# Patient Record
Sex: FEMALE | Race: OTHER | HISPANIC OR LATINO
[De-identification: names, ages, dates, MRNs, and addresses within clinical notes are randomized per-mention and may not be internally consistent; named-entity substitution may affect disease eponyms.]

---

## 2017-11-16 ENCOUNTER — APPOINTMENT (OUTPATIENT)
Dept: INTERNAL MEDICINE | Facility: CLINIC | Age: 52
End: 2017-11-16

## 2017-11-16 ENCOUNTER — OUTPATIENT (OUTPATIENT)
Dept: OUTPATIENT SERVICES | Facility: HOSPITAL | Age: 52
LOS: 1 days | Discharge: HOME | End: 2017-11-16

## 2017-11-16 ENCOUNTER — RESULT REVIEW (OUTPATIENT)
Age: 52
End: 2017-11-16

## 2017-11-16 VITALS
SYSTOLIC BLOOD PRESSURE: 138 MMHG | DIASTOLIC BLOOD PRESSURE: 89 MMHG | WEIGHT: 256 LBS | TEMPERATURE: 97.7 F | HEART RATE: 89 BPM | HEIGHT: 64.25 IN | BODY MASS INDEX: 43.71 KG/M2

## 2017-11-16 DIAGNOSIS — Z82.3 FAMILY HISTORY OF STROKE: ICD-10-CM

## 2017-11-16 DIAGNOSIS — Z83.3 FAMILY HISTORY OF DIABETES MELLITUS: ICD-10-CM

## 2017-11-16 RX ORDER — HYDROCHLOROTHIAZIDE 25 MG/1
25 TABLET ORAL
Qty: 30 | Refills: 0 | Status: DISCONTINUED | COMMUNITY
Start: 2017-08-31

## 2017-11-16 RX ORDER — NITROFURANTOIN (MONOHYDRATE/MACROCRYSTALS) 25; 75 MG/1; MG/1
100 CAPSULE ORAL
Qty: 14 | Refills: 0 | Status: DISCONTINUED | COMMUNITY
Start: 2017-08-31

## 2017-11-16 RX ORDER — KETOROLAC TROMETHAMINE 5 MG/ML
0.5 SOLUTION OPHTHALMIC
Qty: 5 | Refills: 0 | Status: DISCONTINUED | COMMUNITY
Start: 2017-08-31

## 2017-11-17 LAB
ALBUMIN SERPL-MCNC: 3.8 G/DL
ALBUMIN/GLOB SERPL: 1.12
ALP SERPL-CCNC: 89 IU/L
ALT SERPL-CCNC: 21 IU/L
ANION GAP SERPL CALC-SCNC: 8 MEQ/L
AST SERPL-CCNC: 19 IU/L
BASOPHILS # BLD: 0.02 TH/MM3
BASOPHILS NFR BLD: 0.3 %
BILIRUB SERPL-MCNC: 0.6 MG/DL
BUN SERPL-MCNC: 13 MG/DL
BUN/CREAT SERPL: 14.6 %
CALCIUM SERPL-MCNC: 10 MG/DL
CHLORIDE SERPL-SCNC: 104 MEQ/L
CHOLEST SERPL-MCNC: 271 MG/DL
CO2 SERPL-SCNC: 28 MEQ/L
CREAT SERPL-MCNC: 0.89 MG/DL
DIFFERENTIAL METHOD BLD: NORMAL
EOSINOPHIL # BLD: 0.07 TH/MM3
EOSINOPHIL NFR BLD: 1.2 %
ERYTHROCYTE [DISTWIDTH] IN BLOOD BY AUTOMATED COUNT: 16.6 %
ESTIMATED AVERGAGE GLUCOSE (NORTH): 134 MG/DL
GFR SERPL CREATININE-BSD FRML MDRD: 66
GLUCOSE SERPL-MCNC: 111 MG/DL
GRANULOCYTES # BLD: 2.84 TH/MM3
GRANULOCYTES NFR BLD: 47.4 %
HBA1C MFR BLD: 6.3 %
HCT VFR BLD AUTO: 41.5 %
HDLC SERPL-MCNC: 59 MG/DL
HDLC SERPL: 4.59
HGB BLD-MCNC: 13 G/DL
IMM GRANULOCYTES # BLD: 0.01 TH/MM3
IMM GRANULOCYTES NFR BLD: 0.2 %
LDLC SERPL DIRECT ASSAY-MCNC: 190 MG/DL
LYMPHOCYTES # BLD: 2.58 TH/MM3
LYMPHOCYTES NFR BLD: 43.1 %
MCH RBC QN AUTO: 24.5 PG
MCHC RBC AUTO-ENTMCNC: 31.3 G/DL
MCV RBC AUTO: 78.2 FL
MONOCYTES # BLD: 0.47 TH/MM3
MONOCYTES NFR BLD: 7.8 %
PLATELET # BLD: 465 TH/MM3
PMV BLD AUTO: 10.9 FL
POTASSIUM SERPL-SCNC: 4.5 MMOL/L
PROT SERPL-MCNC: 7.2 G/DL
RBC # BLD AUTO: 5.31 MIL/MM3
SODIUM SERPL-SCNC: 140 MEQ/L
TRIGL SERPL-MCNC: 180 MG/DL
TSH SERPL DL<=0.005 MIU/L-ACNC: 0.01 UIU/ML
VLDLC SERPL-MCNC: 36 MG/DL
WBC # BLD: 5.99 TH/MM3

## 2017-11-20 ENCOUNTER — RX RENEWAL (OUTPATIENT)
Age: 52
End: 2017-11-20

## 2017-11-20 DIAGNOSIS — M25.562 PAIN IN LEFT KNEE: ICD-10-CM

## 2017-11-20 DIAGNOSIS — I10 ESSENTIAL (PRIMARY) HYPERTENSION: ICD-10-CM

## 2017-11-20 DIAGNOSIS — R73.03 PREDIABETES: ICD-10-CM

## 2017-11-20 DIAGNOSIS — E66.01 MORBID (SEVERE) OBESITY DUE TO EXCESS CALORIES: ICD-10-CM

## 2017-11-20 DIAGNOSIS — E78.00 PURE HYPERCHOLESTEROLEMIA, UNSPECIFIED: ICD-10-CM

## 2017-11-20 DIAGNOSIS — Z23 ENCOUNTER FOR IMMUNIZATION: ICD-10-CM

## 2017-11-29 ENCOUNTER — OUTPATIENT (OUTPATIENT)
Dept: OUTPATIENT SERVICES | Facility: HOSPITAL | Age: 52
LOS: 1 days | Discharge: HOME | End: 2017-11-29

## 2017-11-29 DIAGNOSIS — Z12.31 ENCOUNTER FOR SCREENING MAMMOGRAM FOR MALIGNANT NEOPLASM OF BREAST: ICD-10-CM

## 2017-11-29 DIAGNOSIS — M19.90 UNSPECIFIED OSTEOARTHRITIS, UNSPECIFIED SITE: ICD-10-CM

## 2017-12-12 ENCOUNTER — APPOINTMENT (OUTPATIENT)
Dept: SURGERY | Facility: CLINIC | Age: 52
End: 2017-12-12
Payer: COMMERCIAL

## 2017-12-12 ENCOUNTER — OUTPATIENT (OUTPATIENT)
Dept: OUTPATIENT SERVICES | Facility: HOSPITAL | Age: 52
LOS: 1 days | Discharge: HOME | End: 2017-12-12

## 2017-12-12 VITALS
SYSTOLIC BLOOD PRESSURE: 148 MMHG | WEIGHT: 261 LBS | DIASTOLIC BLOOD PRESSURE: 76 MMHG | HEIGHT: 64.25 IN | BODY MASS INDEX: 44.56 KG/M2

## 2017-12-12 PROCEDURE — SI006: CPT

## 2017-12-15 ENCOUNTER — OUTPATIENT (OUTPATIENT)
Dept: OUTPATIENT SERVICES | Facility: HOSPITAL | Age: 52
LOS: 1 days | Discharge: HOME | End: 2017-12-15

## 2017-12-15 DIAGNOSIS — R10.9 UNSPECIFIED ABDOMINAL PAIN: ICD-10-CM

## 2018-01-17 ENCOUNTER — APPOINTMENT (OUTPATIENT)
Dept: SURGERY | Facility: CLINIC | Age: 53
End: 2018-01-17

## 2018-01-23 ENCOUNTER — APPOINTMENT (OUTPATIENT)
Dept: SURGERY | Facility: CLINIC | Age: 53
End: 2018-01-23

## 2018-01-26 ENCOUNTER — APPOINTMENT (OUTPATIENT)
Dept: SURGERY | Facility: CLINIC | Age: 53
End: 2018-01-26
Payer: COMMERCIAL

## 2018-01-26 VITALS
SYSTOLIC BLOOD PRESSURE: 142 MMHG | DIASTOLIC BLOOD PRESSURE: 80 MMHG | HEIGHT: 64.25 IN | WEIGHT: 269.1 LBS | BODY MASS INDEX: 45.94 KG/M2

## 2018-01-26 DIAGNOSIS — H10.13 ACUTE ATOPIC CONJUNCTIVITIS, BILATERAL: ICD-10-CM

## 2018-01-26 PROCEDURE — 99244 OFF/OP CNSLTJ NEW/EST MOD 40: CPT

## 2018-01-26 RX ORDER — CETIRIZINE HYDROCHLORIDE 10 MG/1
10 TABLET, COATED ORAL
Qty: 30 | Refills: 0 | Status: DISCONTINUED | COMMUNITY
Start: 2017-11-16 | End: 2018-01-26

## 2018-02-01 ENCOUNTER — APPOINTMENT (OUTPATIENT)
Dept: SURGERY | Facility: CLINIC | Age: 53
End: 2018-02-01
Payer: COMMERCIAL

## 2018-02-01 VITALS — HEIGHT: 64.25 IN | WEIGHT: 269.2 LBS | BODY MASS INDEX: 45.96 KG/M2

## 2018-02-01 PROCEDURE — 97802 MEDICAL NUTRITION INDIV IN: CPT

## 2018-02-02 ENCOUNTER — TRANSCRIPTION ENCOUNTER (OUTPATIENT)
Age: 53
End: 2018-02-02

## 2018-02-20 ENCOUNTER — OUTPATIENT (OUTPATIENT)
Dept: OUTPATIENT SERVICES | Facility: HOSPITAL | Age: 53
LOS: 1 days | Discharge: HOME | End: 2018-02-20

## 2018-02-20 ENCOUNTER — APPOINTMENT (OUTPATIENT)
Dept: INTERNAL MEDICINE | Facility: CLINIC | Age: 53
End: 2018-02-20

## 2018-02-20 VITALS
DIASTOLIC BLOOD PRESSURE: 93 MMHG | WEIGHT: 270 LBS | HEIGHT: 64.55 IN | HEART RATE: 80 BPM | BODY MASS INDEX: 45.53 KG/M2 | SYSTOLIC BLOOD PRESSURE: 142 MMHG

## 2018-02-20 DIAGNOSIS — H53.8 OTHER VISUAL DISTURBANCES: ICD-10-CM

## 2018-02-21 DIAGNOSIS — R42 DIZZINESS AND GIDDINESS: ICD-10-CM

## 2018-02-21 DIAGNOSIS — I10 ESSENTIAL (PRIMARY) HYPERTENSION: ICD-10-CM

## 2018-02-21 DIAGNOSIS — H53.8 OTHER VISUAL DISTURBANCES: ICD-10-CM

## 2018-02-21 RX ORDER — ATORVASTATIN CALCIUM 20 MG/1
20 TABLET, FILM COATED ORAL
Qty: 30 | Refills: 5 | Status: DISCONTINUED | COMMUNITY
Start: 2017-11-17 | End: 2018-02-21

## 2018-03-02 ENCOUNTER — APPOINTMENT (OUTPATIENT)
Dept: INTERNAL MEDICINE | Facility: CLINIC | Age: 53
End: 2018-03-02

## 2018-03-02 ENCOUNTER — OUTPATIENT (OUTPATIENT)
Dept: OUTPATIENT SERVICES | Facility: HOSPITAL | Age: 53
LOS: 1 days | Discharge: HOME | End: 2018-03-02

## 2018-03-05 DIAGNOSIS — E66.01 MORBID (SEVERE) OBESITY DUE TO EXCESS CALORIES: ICD-10-CM

## 2018-03-06 ENCOUNTER — RX RENEWAL (OUTPATIENT)
Age: 53
End: 2018-03-06

## 2018-03-06 LAB
FERRITIN SERPL-MCNC: 24 NG/ML
T3FREE SERPL-MCNC: 3.62 PG/ML
T4 FREE SERPL-MCNC: 1.6 NG/DL
TSH SERPL-ACNC: 0.01 UIU/ML
VIT B12 SERPL-MCNC: 489 PG/ML

## 2018-03-08 ENCOUNTER — APPOINTMENT (OUTPATIENT)
Dept: SURGERY | Facility: CLINIC | Age: 53
End: 2018-03-08
Payer: COMMERCIAL

## 2018-03-08 ENCOUNTER — APPOINTMENT (OUTPATIENT)
Dept: INTERNAL MEDICINE | Facility: CLINIC | Age: 53
End: 2018-03-08

## 2018-03-08 VITALS — BODY MASS INDEX: 45.53 KG/M2 | WEIGHT: 270 LBS | HEIGHT: 64.5 IN

## 2018-03-08 PROCEDURE — 99212 OFFICE O/P EST SF 10 MIN: CPT

## 2018-03-16 ENCOUNTER — RESULT REVIEW (OUTPATIENT)
Age: 53
End: 2018-03-16

## 2018-03-22 ENCOUNTER — APPOINTMENT (OUTPATIENT)
Dept: INTERNAL MEDICINE | Facility: CLINIC | Age: 53
End: 2018-03-22

## 2018-03-27 ENCOUNTER — OUTPATIENT (OUTPATIENT)
Dept: OUTPATIENT SERVICES | Facility: HOSPITAL | Age: 53
LOS: 1 days | Discharge: HOME | End: 2018-03-27

## 2018-03-27 ENCOUNTER — APPOINTMENT (OUTPATIENT)
Dept: INTERNAL MEDICINE | Facility: CLINIC | Age: 53
End: 2018-03-27

## 2018-03-27 VITALS
HEART RATE: 62 BPM | BODY MASS INDEX: 46.04 KG/M2 | HEIGHT: 64.5 IN | SYSTOLIC BLOOD PRESSURE: 140 MMHG | WEIGHT: 273 LBS | DIASTOLIC BLOOD PRESSURE: 86 MMHG

## 2018-03-27 DIAGNOSIS — E78.2 MIXED HYPERLIPIDEMIA: ICD-10-CM

## 2018-03-27 DIAGNOSIS — E66.1 DRUG-INDUCED OBESITY: ICD-10-CM

## 2018-03-27 DIAGNOSIS — R73.03 PREDIABETES: ICD-10-CM

## 2018-03-27 DIAGNOSIS — I10 ESSENTIAL (PRIMARY) HYPERTENSION: ICD-10-CM

## 2018-03-28 DIAGNOSIS — I10 ESSENTIAL (PRIMARY) HYPERTENSION: ICD-10-CM

## 2018-03-28 DIAGNOSIS — E78.00 PURE HYPERCHOLESTEROLEMIA, UNSPECIFIED: ICD-10-CM

## 2018-03-28 DIAGNOSIS — R73.03 PREDIABETES: ICD-10-CM

## 2018-03-28 DIAGNOSIS — E05.90 THYROTOXICOSIS, UNSPECIFIED WITHOUT THYROTOXIC CRISIS OR STORM: ICD-10-CM

## 2018-03-28 DIAGNOSIS — E66.01 MORBID (SEVERE) OBESITY DUE TO EXCESS CALORIES: ICD-10-CM

## 2018-04-12 ENCOUNTER — APPOINTMENT (OUTPATIENT)
Dept: SURGERY | Facility: CLINIC | Age: 53
End: 2018-04-12
Payer: SELF-PAY

## 2018-04-12 VITALS — BODY MASS INDEX: 46.21 KG/M2 | HEIGHT: 64.5 IN | WEIGHT: 274 LBS

## 2018-04-12 PROCEDURE — 99212 OFFICE O/P EST SF 10 MIN: CPT

## 2018-05-05 RX ORDER — MULTIVIT-MIN/FOLIC/VIT K/LYCOP 400-300MCG
25 MCG TABLET ORAL
Qty: 30 | Refills: 3 | Status: ACTIVE | COMMUNITY
Start: 2018-03-27 | End: 1900-01-01

## 2018-05-05 RX ORDER — ERGOCALCIFEROL 1.25 MG/1
1.25 MG CAPSULE, LIQUID FILLED ORAL
Qty: 2 | Refills: 0 | Status: ACTIVE | COMMUNITY
Start: 2018-03-27 | End: 1900-01-01

## 2018-05-15 ENCOUNTER — APPOINTMENT (OUTPATIENT)
Dept: SURGERY | Facility: CLINIC | Age: 53
End: 2018-05-15
Payer: MEDICAID

## 2018-05-15 VITALS — HEIGHT: 64.5 IN | WEIGHT: 272 LBS | BODY MASS INDEX: 45.87 KG/M2

## 2018-05-15 PROCEDURE — 99212 OFFICE O/P EST SF 10 MIN: CPT

## 2018-05-17 ENCOUNTER — OUTPATIENT (OUTPATIENT)
Dept: OUTPATIENT SERVICES | Facility: HOSPITAL | Age: 53
LOS: 1 days | Discharge: HOME | End: 2018-05-17

## 2018-05-17 ENCOUNTER — APPOINTMENT (OUTPATIENT)
Dept: INTERNAL MEDICINE | Facility: CLINIC | Age: 53
End: 2018-05-17

## 2018-05-17 VITALS
HEIGHT: 64.5 IN | HEART RATE: 72 BPM | DIASTOLIC BLOOD PRESSURE: 81 MMHG | WEIGHT: 274 LBS | BODY MASS INDEX: 46.21 KG/M2 | SYSTOLIC BLOOD PRESSURE: 138 MMHG

## 2018-05-18 LAB
25(OH)D3 SERPL-MCNC: 17.6 NG/ML
CALCIUM SERPL-MCNC: 9.7 MG/DL
CHOLEST SERPL-MCNC: 182 MG/DL
CHOLEST/HDLC SERPL: 3.2 RATIO
FOLATE RBC-MCNC: 902 NG/ML
HCT VFR BLD CALC: 44 %
HDLC SERPL-MCNC: 57 MG/DL
IRON SATN MFR SERPL: 13 %
IRON SERPL-MCNC: 42 UG/DL
LDLC SERPL CALC-MCNC: 96 MG/DL
PARATHYROID HORMONE INTACT: 27 PG/ML
TIBC SERPL-MCNC: 334 UG/DL
TRIGL SERPL-MCNC: 144 MG/DL
UIBC SERPL-MCNC: 292 UG/DL
VIT B1 SERPL-MCNC: 131.3 NMOL/L

## 2018-05-21 ENCOUNTER — APPOINTMENT (OUTPATIENT)
Dept: INTERNAL MEDICINE | Facility: CLINIC | Age: 53
End: 2018-05-21

## 2018-05-28 ENCOUNTER — FORM ENCOUNTER (OUTPATIENT)
Age: 53
End: 2018-05-28

## 2018-05-29 ENCOUNTER — OUTPATIENT (OUTPATIENT)
Dept: OUTPATIENT SERVICES | Facility: HOSPITAL | Age: 53
LOS: 1 days | Discharge: HOME | End: 2018-05-29

## 2018-05-29 DIAGNOSIS — N95.0 POSTMENOPAUSAL BLEEDING: ICD-10-CM

## 2018-05-29 LAB — HPV HIGH+LOW RISK DNA PNL CVX: NOT DETECTED

## 2018-05-31 DIAGNOSIS — N95.0 POSTMENOPAUSAL BLEEDING: ICD-10-CM

## 2018-06-14 ENCOUNTER — OUTPATIENT (OUTPATIENT)
Dept: OUTPATIENT SERVICES | Facility: HOSPITAL | Age: 53
LOS: 1 days | Discharge: HOME | End: 2018-06-14

## 2018-06-14 DIAGNOSIS — R05 COUGH: ICD-10-CM

## 2018-06-20 ENCOUNTER — APPOINTMENT (OUTPATIENT)
Dept: INTERNAL MEDICINE | Facility: CLINIC | Age: 53
End: 2018-06-20

## 2018-06-21 ENCOUNTER — EMERGENCY (EMERGENCY)
Facility: HOSPITAL | Age: 53
LOS: 0 days | Discharge: HOME | End: 2018-06-21
Admitting: PHYSICIAN ASSISTANT

## 2018-06-21 ENCOUNTER — APPOINTMENT (OUTPATIENT)
Dept: SURGERY | Facility: CLINIC | Age: 53
End: 2018-06-21
Payer: MEDICAID

## 2018-06-21 VITALS
RESPIRATION RATE: 20 BRPM | OXYGEN SATURATION: 98 % | DIASTOLIC BLOOD PRESSURE: 66 MMHG | TEMPERATURE: 99 F | HEART RATE: 85 BPM | SYSTOLIC BLOOD PRESSURE: 105 MMHG

## 2018-06-21 VITALS
RESPIRATION RATE: 20 BRPM | HEART RATE: 76 BPM | OXYGEN SATURATION: 100 % | DIASTOLIC BLOOD PRESSURE: 74 MMHG | SYSTOLIC BLOOD PRESSURE: 144 MMHG | TEMPERATURE: 98 F

## 2018-06-21 VITALS — WEIGHT: 272 LBS | HEIGHT: 64.5 IN | BODY MASS INDEX: 45.87 KG/M2

## 2018-06-21 DIAGNOSIS — M25.561 PAIN IN RIGHT KNEE: ICD-10-CM

## 2018-06-21 DIAGNOSIS — E78.5 HYPERLIPIDEMIA, UNSPECIFIED: ICD-10-CM

## 2018-06-21 DIAGNOSIS — I10 ESSENTIAL (PRIMARY) HYPERTENSION: ICD-10-CM

## 2018-06-21 PROCEDURE — 99212 OFFICE O/P EST SF 10 MIN: CPT

## 2018-06-21 RX ORDER — IBUPROFEN 200 MG
600 TABLET ORAL ONCE
Qty: 0 | Refills: 0 | Status: COMPLETED | OUTPATIENT
Start: 2018-06-21 | End: 2018-06-21

## 2018-06-21 RX ADMIN — Medication 600 MILLIGRAM(S): at 16:08

## 2018-06-21 NOTE — ED PROVIDER NOTE - OBJECTIVE STATEMENT
52 y/o F, PMHx HTN & Dyslipidemia, presents to the ED with complaints of right knee pain x one week. She admits to pain along the medial aspect of her right knee that is exacerbated upon ascending/descending the stairs. She admits to having arthritis in her left knee and plans on having bariatric surgery in the future. She denies any swelling, skin color changes/rash, inciting trauma/injury, calf pain and additional areas of pain.

## 2018-06-28 ENCOUNTER — OUTPATIENT (OUTPATIENT)
Dept: OUTPATIENT SERVICES | Facility: HOSPITAL | Age: 53
LOS: 1 days | Discharge: HOME | End: 2018-06-28

## 2018-06-28 ENCOUNTER — APPOINTMENT (OUTPATIENT)
Dept: INTERNAL MEDICINE | Facility: CLINIC | Age: 53
End: 2018-06-28

## 2018-06-28 VITALS — WEIGHT: 272 LBS | BODY MASS INDEX: 45.87 KG/M2 | HEIGHT: 64.5 IN

## 2018-06-28 DIAGNOSIS — Z00.00 ENCOUNTER FOR GENERAL ADULT MEDICAL EXAMINATION W/OUT ABNORMAL FINDINGS: ICD-10-CM

## 2018-06-28 RX ORDER — LISINOPRIL 10 MG/1
10 TABLET ORAL TWICE DAILY
Qty: 30 | Refills: 3 | Status: DISCONTINUED | COMMUNITY
Start: 2018-02-20 | End: 2018-06-28

## 2018-06-28 NOTE — HISTORY OF PRESENT ILLNESS
[FreeTextEntry1] : 53 y/o female Hx of Pre-DM,  LMP about year then had menses x 5 days. Patient currently denies any complaints. \par \par Patient denies any fevers, chills, and night sweats. Patient denies any chest pain, shortness of breath, and palpitations. Patient denies any nausea, vomiting, and diarrhea.\par \par GYN HX\par gyn surgeries c/s x 1\par no STD's\par no hx of abnormal pap \par  [de-identified] : 53 y/o female Hx of Pre-DM,  LMP about year then had menses x 5 days. Patient currently denies any complaints. \par \par Patient denies any fevers, chills, and night sweats. Patient denies any chest pain, shortness of breath, and palpitations. Patient denies any nausea, vomiting, and diarrhea.\par \par GYN HX\par gyn surgeries c/s x 1\par no STD's\par no hx of abnormal pap \par

## 2018-06-28 NOTE — ASSESSMENT
[FreeTextEntry1] : 51 y/o female Hx of Pre-DM,  LMP about year then had menses x 5 days. Patietn currently denies any complaints.\par \par #) Post-menopausal bleeding \par -refer to [facility] for Bx given on last visit\par \par #) Enlarged Uterus\par -Transvaginal US (2018)- postmenopausal thickened endometrial stripe 0.69cm compatible w/ hyperplasia, neoplasia, or polyp, fibroid uterus, nonvisualization of the ovaries due to bowel gas pattern\par \par #) HCM\par -next mammogram 2018, last 2017- negative, BIRADS-1\par -HPV/Pap smear (2018)- negative

## 2018-07-16 ENCOUNTER — APPOINTMENT (OUTPATIENT)
Dept: SURGERY | Facility: CLINIC | Age: 53
End: 2018-07-16
Payer: MEDICAID

## 2018-07-16 VITALS — BODY MASS INDEX: 46.78 KG/M2 | WEIGHT: 274 LBS | HEIGHT: 64.25 IN

## 2018-07-16 PROCEDURE — 99212 OFFICE O/P EST SF 10 MIN: CPT

## 2018-07-16 RX ORDER — CETIRIZINE HYDROCHLORIDE 10 MG/1
10 TABLET, COATED ORAL DAILY
Qty: 30 | Refills: 1 | Status: DISCONTINUED | COMMUNITY
Start: 2018-02-20 | End: 2018-07-16

## 2018-07-18 ENCOUNTER — LABORATORY RESULT (OUTPATIENT)
Age: 53
End: 2018-07-18

## 2018-07-18 ENCOUNTER — APPOINTMENT (OUTPATIENT)
Dept: OBGYN | Facility: CLINIC | Age: 53
End: 2018-07-18

## 2018-07-18 ENCOUNTER — OUTPATIENT (OUTPATIENT)
Dept: OUTPATIENT SERVICES | Facility: HOSPITAL | Age: 53
LOS: 1 days | Discharge: HOME | End: 2018-07-18

## 2018-07-18 VITALS — WEIGHT: 273 LBS | BODY MASS INDEX: 46.5 KG/M2 | SYSTOLIC BLOOD PRESSURE: 122 MMHG | DIASTOLIC BLOOD PRESSURE: 80 MMHG

## 2018-07-18 DIAGNOSIS — N88.2 STRICTURE AND STENOSIS OF CERVIX UTERI: ICD-10-CM

## 2018-07-19 DIAGNOSIS — N95.0 POSTMENOPAUSAL BLEEDING: ICD-10-CM

## 2018-07-19 DIAGNOSIS — N88.2 STRICTURE AND STENOSIS OF CERVIX UTERI: ICD-10-CM

## 2018-07-21 ENCOUNTER — INPATIENT (INPATIENT)
Facility: HOSPITAL | Age: 53
LOS: 5 days | Discharge: HOME | End: 2018-07-27
Attending: INTERNAL MEDICINE | Admitting: INTERNAL MEDICINE
Payer: MEDICAID

## 2018-07-21 VITALS
DIASTOLIC BLOOD PRESSURE: 91 MMHG | RESPIRATION RATE: 18 BRPM | HEART RATE: 87 BPM | OXYGEN SATURATION: 100 % | TEMPERATURE: 97 F | SYSTOLIC BLOOD PRESSURE: 196 MMHG

## 2018-07-21 DIAGNOSIS — I10 ESSENTIAL (PRIMARY) HYPERTENSION: ICD-10-CM

## 2018-07-21 DIAGNOSIS — R47.81 SLURRED SPEECH: ICD-10-CM

## 2018-07-21 DIAGNOSIS — R29.703 NIHSS SCORE 3: ICD-10-CM

## 2018-07-21 DIAGNOSIS — I63.9 CEREBRAL INFARCTION, UNSPECIFIED: ICD-10-CM

## 2018-07-21 DIAGNOSIS — E03.8 OTHER SPECIFIED HYPOTHYROIDISM: ICD-10-CM

## 2018-07-21 DIAGNOSIS — M54.12 RADICULOPATHY, CERVICAL REGION: ICD-10-CM

## 2018-07-21 DIAGNOSIS — K21.9 GASTRO-ESOPHAGEAL REFLUX DISEASE WITHOUT ESOPHAGITIS: ICD-10-CM

## 2018-07-21 DIAGNOSIS — G43.109 MIGRAINE WITH AURA, NOT INTRACTABLE, WITHOUT STATUS MIGRAINOSUS: ICD-10-CM

## 2018-07-21 DIAGNOSIS — E66.01 MORBID (SEVERE) OBESITY DUE TO EXCESS CALORIES: ICD-10-CM

## 2018-07-21 DIAGNOSIS — E78.5 HYPERLIPIDEMIA, UNSPECIFIED: ICD-10-CM

## 2018-07-21 DIAGNOSIS — Z90.49 ACQUIRED ABSENCE OF OTHER SPECIFIED PARTS OF DIGESTIVE TRACT: Chronic | ICD-10-CM

## 2018-07-21 DIAGNOSIS — G81.94 HEMIPLEGIA, UNSPECIFIED AFFECTING LEFT NONDOMINANT SIDE: ICD-10-CM

## 2018-07-21 DIAGNOSIS — A04.8 OTHER SPECIFIED BACTERIAL INTESTINAL INFECTIONS: ICD-10-CM

## 2018-07-21 LAB
ALBUMIN SERPL ELPH-MCNC: 4.3 G/DL — SIGNIFICANT CHANGE UP (ref 3.5–5.2)
ALLERGY+IMMUNOLOGY DIAG STUDY NOTE: SIGNIFICANT CHANGE UP
ALP SERPL-CCNC: 93 U/L — SIGNIFICANT CHANGE UP (ref 30–115)
ALT FLD-CCNC: 20 U/L — SIGNIFICANT CHANGE UP (ref 0–41)
ANION GAP SERPL CALC-SCNC: 15 MMOL/L — HIGH (ref 7–14)
APTT BLD: 33 SEC — SIGNIFICANT CHANGE UP (ref 27–39.2)
AST SERPL-CCNC: 18 U/L — SIGNIFICANT CHANGE UP (ref 0–41)
BASE EXCESS BLDV CALC-SCNC: 2.1 MMOL/L — HIGH (ref -2–2)
BASOPHILS # BLD AUTO: 0.04 K/UL — SIGNIFICANT CHANGE UP (ref 0–0.2)
BASOPHILS NFR BLD AUTO: 0.6 % — SIGNIFICANT CHANGE UP (ref 0–1)
BILIRUB SERPL-MCNC: 0.3 MG/DL — SIGNIFICANT CHANGE UP (ref 0.2–1.2)
BUN SERPL-MCNC: 13 MG/DL — SIGNIFICANT CHANGE UP (ref 10–20)
CA-I SERPL-SCNC: 1.2 MMOL/L — SIGNIFICANT CHANGE UP (ref 1.12–1.3)
CALCIUM SERPL-MCNC: 9.5 MG/DL — SIGNIFICANT CHANGE UP (ref 8.5–10.1)
CHLORIDE SERPL-SCNC: 99 MMOL/L — SIGNIFICANT CHANGE UP (ref 98–110)
CK SERPL-CCNC: 163 U/L — SIGNIFICANT CHANGE UP (ref 0–225)
CO2 SERPL-SCNC: 26 MMOL/L — SIGNIFICANT CHANGE UP (ref 17–32)
CREAT SERPL-MCNC: 1 MG/DL — SIGNIFICANT CHANGE UP (ref 0.7–1.5)
EOSINOPHIL # BLD AUTO: 0.17 K/UL — SIGNIFICANT CHANGE UP (ref 0–0.7)
EOSINOPHIL NFR BLD AUTO: 2.7 % — SIGNIFICANT CHANGE UP (ref 0–8)
GAS PNL BLDV: 140 MMOL/L — SIGNIFICANT CHANGE UP (ref 136–145)
GAS PNL BLDV: SIGNIFICANT CHANGE UP
GLUCOSE SERPL-MCNC: 130 MG/DL — HIGH (ref 70–99)
HCO3 BLDV-SCNC: 28 MMOL/L — SIGNIFICANT CHANGE UP (ref 22–29)
HCT VFR BLD CALC: 39.3 % — SIGNIFICANT CHANGE UP (ref 37–47)
HCT VFR BLDA CALC: 41.6 % — SIGNIFICANT CHANGE UP (ref 34–44)
HGB BLD CALC-MCNC: 13.6 G/DL — LOW (ref 14–18)
HGB BLD-MCNC: 12.8 G/DL — SIGNIFICANT CHANGE UP (ref 12–16)
IMM GRANULOCYTES NFR BLD AUTO: 0.2 % — SIGNIFICANT CHANGE UP (ref 0.1–0.3)
INR BLD: 1.01 RATIO — SIGNIFICANT CHANGE UP (ref 0.65–1.3)
LACTATE BLDV-MCNC: 1.3 MMOL/L — SIGNIFICANT CHANGE UP (ref 0.5–1.6)
LYMPHOCYTES # BLD AUTO: 3.28 K/UL — SIGNIFICANT CHANGE UP (ref 1.2–3.4)
LYMPHOCYTES # BLD AUTO: 51.8 % — HIGH (ref 20.5–51.1)
MCHC RBC-ENTMCNC: 25.9 PG — LOW (ref 27–31)
MCHC RBC-ENTMCNC: 32.6 G/DL — SIGNIFICANT CHANGE UP (ref 32–37)
MCV RBC AUTO: 79.4 FL — LOW (ref 81–99)
MONOCYTES # BLD AUTO: 0.49 K/UL — SIGNIFICANT CHANGE UP (ref 0.1–0.6)
MONOCYTES NFR BLD AUTO: 7.7 % — SIGNIFICANT CHANGE UP (ref 1.7–9.3)
NEUTROPHILS # BLD AUTO: 2.34 K/UL — SIGNIFICANT CHANGE UP (ref 1.4–6.5)
NEUTROPHILS NFR BLD AUTO: 37 % — LOW (ref 42.2–75.2)
NRBC # BLD: 0 /100 WBCS — SIGNIFICANT CHANGE UP (ref 0–0)
PCO2 BLDV: 48 MMHG — SIGNIFICANT CHANGE UP (ref 41–51)
PH BLDV: 7.37 — SIGNIFICANT CHANGE UP (ref 7.26–7.43)
PLATELET # BLD AUTO: 408 K/UL — HIGH (ref 130–400)
PO2 BLDV: 22 MMHG — SIGNIFICANT CHANGE UP (ref 20–40)
POTASSIUM BLDV-SCNC: 3.6 MMOL/L — SIGNIFICANT CHANGE UP (ref 3.3–5.6)
POTASSIUM SERPL-MCNC: 4 MMOL/L — SIGNIFICANT CHANGE UP (ref 3.5–5)
POTASSIUM SERPL-SCNC: 4 MMOL/L — SIGNIFICANT CHANGE UP (ref 3.5–5)
PROT SERPL-MCNC: 7.7 G/DL — SIGNIFICANT CHANGE UP (ref 6–8)
PROTHROM AB SERPL-ACNC: 10.9 SEC — SIGNIFICANT CHANGE UP (ref 9.95–12.87)
RBC # BLD: 4.95 M/UL — SIGNIFICANT CHANGE UP (ref 4.2–5.4)
RBC # FLD: 15.8 % — HIGH (ref 11.5–14.5)
SAO2 % BLDV: 35 % — SIGNIFICANT CHANGE UP
SODIUM SERPL-SCNC: 140 MMOL/L — SIGNIFICANT CHANGE UP (ref 135–146)
TROPONIN T SERPL-MCNC: <0.01 NG/ML — SIGNIFICANT CHANGE UP
TYPE + AB SCN PNL BLD: SIGNIFICANT CHANGE UP
WBC # BLD: 6.33 K/UL — SIGNIFICANT CHANGE UP (ref 4.8–10.8)
WBC # FLD AUTO: 6.33 K/UL — SIGNIFICANT CHANGE UP (ref 4.8–10.8)

## 2018-07-21 RX ORDER — ALTEPLASE 100 MG
9 KIT INTRAVENOUS ONCE
Qty: 0 | Refills: 0 | Status: COMPLETED | OUTPATIENT
Start: 2018-07-21 | End: 2018-07-21

## 2018-07-21 RX ORDER — CLARITHROMYCIN 500 MG
0 TABLET ORAL
Qty: 0 | Refills: 0 | COMMUNITY

## 2018-07-21 RX ORDER — ACETAMINOPHEN 500 MG
500 TABLET ORAL ONCE
Qty: 0 | Refills: 0 | Status: COMPLETED | OUTPATIENT
Start: 2018-07-21 | End: 2018-07-22

## 2018-07-21 RX ORDER — ALTEPLASE 100 MG
81 KIT INTRAVENOUS ONCE
Qty: 0 | Refills: 0 | Status: COMPLETED | OUTPATIENT
Start: 2018-07-21 | End: 2018-07-21

## 2018-07-21 RX ORDER — LISINOPRIL 2.5 MG/1
0 TABLET ORAL
Qty: 0 | Refills: 0 | COMMUNITY

## 2018-07-21 RX ORDER — ATORVASTATIN CALCIUM 80 MG/1
80 TABLET, FILM COATED ORAL AT BEDTIME
Qty: 0 | Refills: 0 | Status: DISCONTINUED | OUTPATIENT
Start: 2018-07-21 | End: 2018-07-27

## 2018-07-21 RX ORDER — ATORVASTATIN CALCIUM 80 MG/1
1 TABLET, FILM COATED ORAL
Qty: 0 | Refills: 0 | COMMUNITY

## 2018-07-21 RX ORDER — ACETAMINOPHEN 500 MG
500 TABLET ORAL THREE TIMES A DAY
Qty: 0 | Refills: 0 | Status: DISCONTINUED | OUTPATIENT
Start: 2018-07-21 | End: 2018-07-22

## 2018-07-21 RX ORDER — ALTEPLASE 100 MG
100 KIT INTRAVENOUS ONCE
Qty: 0 | Refills: 0 | Status: DISCONTINUED | OUTPATIENT
Start: 2018-07-21 | End: 2018-07-21

## 2018-07-21 RX ORDER — PANTOPRAZOLE SODIUM 20 MG/1
40 TABLET, DELAYED RELEASE ORAL
Qty: 0 | Refills: 0 | Status: DISCONTINUED | OUTPATIENT
Start: 2018-07-21 | End: 2018-07-22

## 2018-07-21 RX ORDER — ALTEPLASE 100 MG
81 KIT INTRAVENOUS ONCE
Qty: 0 | Refills: 0 | Status: DISCONTINUED | OUTPATIENT
Start: 2018-07-21 | End: 2018-07-21

## 2018-07-21 RX ORDER — AMOXICILLIN 250 MG/5ML
0 SUSPENSION, RECONSTITUTED, ORAL (ML) ORAL
Qty: 0 | Refills: 0 | COMMUNITY

## 2018-07-21 RX ORDER — OMEPRAZOLE 10 MG/1
0 CAPSULE, DELAYED RELEASE ORAL
Qty: 0 | Refills: 0 | COMMUNITY

## 2018-07-21 RX ORDER — AMOXICILLIN 250 MG/5ML
1000 SUSPENSION, RECONSTITUTED, ORAL (ML) ORAL
Qty: 0 | Refills: 0 | Status: DISCONTINUED | OUTPATIENT
Start: 2018-07-21 | End: 2018-07-26

## 2018-07-21 RX ADMIN — ALTEPLASE 81 MILLIGRAM(S): KIT at 19:35

## 2018-07-21 RX ADMIN — ALTEPLASE 270 MILLIGRAM(S): KIT at 19:35

## 2018-07-21 NOTE — H&P ADULT - PMH
GERD (gastroesophageal reflux disease)    Helicobacter pylori (H. pylori) infection    High cholesterol    HTN (hypertension)    Prediabetes

## 2018-07-21 NOTE — H&P ADULT - ASSESSMENT
53 y f with pmh of hhtn, hld, gerd, prediabetes, hpylori on treatment p/w dizziness, blurry vision, lt arm numbness and facial numbness    1) lt arm and facial numbness and slurred speecj 2/2 acute cva s/p tpa  - Keep SBP <180 DBP  - Neurochecks and vitals per post tpa protocol  - No blood draws or antiplatelets or anticoagulation in 1st 24 hours  - Dysphagia screen prior to PO meds  - ECHO. lipid profile  - repeat ct head if any change in symptoms  - if NIHSS increases from current score to >6 then would pursue CTA and CT perfusion    2) hpylori- c/w amoxicillin, clarithromycin and protonix- 2 more days of abx    3) hld- increase lipitor 80mg    4) htn- hold lisinopril    5) dvt ppx- scd  gi ppx  full code

## 2018-07-21 NOTE — PATIENT PROFILE ADULT. - AS SC BRADEN MOISTURE
Chronic kidney disease, unspecified stage    COPD (chronic obstructive pulmonary disease)    Hyperlipidemia, unspecified hyperlipidemia type    Hypertension    Lung abscess    PE (pulmonary thromboembolism)    Rebound headache (4) rarely moist

## 2018-07-21 NOTE — H&P ADULT - FAMILY HISTORY
Mother  Still living? Unknown  Family history of stroke, Age at diagnosis: Age Unknown     Sibling  Still living? Unknown  Family history of stroke, Age at diagnosis: Age Unknown

## 2018-07-21 NOTE — ED ADULT NURSE NOTE - OBJECTIVE STATEMENT
as per patient's spouse, patient suddenly became dizzy, had trouble speaking, and has tingling on left side of face. as per spouse symptoms started approx 45 min prior to arrival

## 2018-07-21 NOTE — STROKE CODE NOTE - SUBJECTIVE
52yo woman with history of obesity, borderline hypothyroidism, gerd here for sudden onset of dizziness and difficulty speaking.  Patient was watching tv with  when she suddenly felt  dizzy and according to  had difficulty speaking and her speech was slurred.  He brought her to the ED.  In ED she was slightly better able to speak more but did not resolve completely and improved to NIHSS 3

## 2018-07-21 NOTE — ED PROVIDER NOTE - ATTENDING CONTRIBUTION TO CARE
53 year old female, pmhx of htn, comes in with complaint of difficulty speaking, left lower half facial droop, left arm numbness and sensory deficit, started 1 hour prior to arrival, no cp/sob, no n/v/d, no loc, no fever. Stroke code called upon patient arrival. Patient ct head unremarkable, discussed case with DR. Moffett, initial NIH 2-3 for left arm numbness, and articulation difficulty, patient accepted tpa/ Dr. Moffett on telestroke.     CONSTITUTIONAL: Well-developed; well-nourished; in no acute distress. Sitting up and providing appropriate history and physical examination  SKIN: skin exam is warm and dry, no acute rash.  HEAD: Normocephalic; atraumatic.  EYES: PERRL, 3 mm bilateral, no nystagmus, EOM intact; conjunctiva and sclera clear.  ENT: No nasal discharge; airway clear.  NECK: Supple; non tender. + full passive ROM in all directions. No JVD  CARD: S1, S2 normal; no murmurs, gallops, or rubs. Regular rate and rhythm. + Symmetric Strong Pulses  RESP: No wheezes, rales or rhonchi. Good air movement bilaterally  ABD: soft; non-distended; non-tender. No Rebound, No Guarding, No signs of peritonitis, No CVA tenderness. No pulsatile abdominal mass. + Strong and Symmetric Pulses  EXT: Normal ROM. No clubbing, cyanosis or edema. Dp and Pt Pulses intact. Cap refill less than 3 seconds  NEURO: + left lower half facial droop, + left v2 and v3 sensory deficits, + lue sensory deficit, + difficulty articulating speech but rapidly improving, otherwise CN 2-12 intact, normal finger to nose, normal Romberg,  Alert, oriented,  GCS 15. NIH 2-3  PSYCH: Cooperative, appropriate.

## 2018-07-21 NOTE — STROKE CODE NOTE - OBJECTIVE
Exam  Able to name difficulty getting words out and sometimes pauses for some time.  Speech mild dysarthirc and low volume  EOMI, No facial however had left facial prior to my exam  Decreased sensation left face arm and leg  no drift in UE or LE  FTN NL  no neglect

## 2018-07-21 NOTE — ED PROVIDER NOTE - CRITICAL CARE PROVIDED
interpretation of diagnostic studies/consultation with other physicians/telephone consultation with the patient's family/direct patient care (not related to procedure)/documentation/consult w/ pt's family directly relating to pts condition/additional history taking

## 2018-07-21 NOTE — ED ADULT NURSE REASSESSMENT NOTE - NS ED NURSE REASSESS COMMENT FT1
Received pt from home brought in by  stating patient was speaking clearly at 1700 when patient stated she wanted to watch movie and then at 1800  noticed patient began to have slurred speech and patient was complaining of left facial numbness and dizziness and blurry vision. Pt brought into ER, 2 18G IV inserted, code stroke activated, transported patient to CT Scan with pt on cardiac monitor. Tele stroke completed at 1900 with Md Giovani Warren. Alarcon cath 16 g inserted in ED prior to TPA administration. TPA bolus administered by ED MD and TPA infusion started on IV Pump, pts weigt 122 KG. Pt a&ox4, slurred speech, able to verbalize benefits and risks of TPA medication with  as witness. Pt has mild left facial numbness and left arm numbness. Safety maintained and hourly rounding performed. Will continue with plan of care.

## 2018-07-21 NOTE — ED PROVIDER NOTE - PHYSICAL EXAMINATION
VITAL SIGNS: I have reviewed nursing notes and confirm.  CONSTITUTIONAL: Well-developed; well-nourished; in no acute distress. pt comfortable.  SKIN: skin exam is warm and dry, no acute rash.   HEAD: Normocephalic; atraumatic.  EYES:  EOM intact; conjunctiva and sclera clear.  ENT: No nasal discharge; airway clear. moist oral mucosa; uvula at midline. no pharyngeal erythema, edema exudate or vesicles.  TMs with good light reflex b/l.    NECK: Supple; non tender.  CARD: S1, S2 normal; no murmurs, gallops, or rubs. Regular rate and rhythm. posterior tibial and radial pulses 2+  RESP: No wheezes, rales or rhonchi. cta b/l. no use of accessory muscles. no retractions  ABD: Normal bowel sounds; soft; non-distended; non-tender; no rebound. negative psoas, rovsign's and murphys.  EXT: Normal ROM. No  cyanosis or edema.  BACK: No cva tenderness  LYMPH: No acute cervical adenopathy.  NEURO: Alert, oriented, grossly unremarkable.  CN 2-12 intact. normal gait. normal romberg's.  upper and lower extremity.  5/5 strength to , , flexion at hip b/l. reports dec. sensation to left face and left upper extremity. normal sensation to lower exremity.  PSYCH: Cooperative, appropriate.

## 2018-07-21 NOTE — H&P ADULT - NSHPLABSRESULTS_GEN_ALL_CORE
12.8   6.33  )-----------( 408      ( 21 Jul 2018 18:00 )             39.3       07-21    140  |  99  |  13  ----------------------------<  130<H>  4.0   |  26  |  1.0    Ca    9.5      21 Jul 2018 18:00    TPro  7.7  /  Alb  4.3  /  TBili  0.3  /  DBili  x   /  AST  18  /  ALT  20  /  AlkPhos  93  07-21                  PT/INR - ( 21 Jul 2018 18:00 )   PT: 10.90 sec;   INR: 1.01 ratio         PTT - ( 21 Jul 2018 18:00 )  PTT:33.0 sec    Lactate Trend      CARDIAC MARKERS ( 21 Jul 2018 18:00 )  x     / <0.01 ng/mL / 163 U/L / x     / x            CAPILLARY BLOOD GLUCOSE        < from: CT Head No Cont (07.21.18 @ 18:53) >      No evidence of acute intracranial pathology.    < end of copied text >

## 2018-07-21 NOTE — H&P ADULT - NSHPPHYSICALEXAM_GEN_ALL_CORE
T(C): 36.7 (07-21-18 @ 20:50), Max: 36.8 (07-21-18 @ 18:43)  HR: 77 (07-21-18 @ 20:50) (71 - 92)  BP: 150/70 (07-21-18 @ 20:50) (129/92 - 196/91)  RR: 18 (07-21-18 @ 20:50) (18 - 18)  SpO2: 98% (07-21-18 @ 20:50) (98% - 100%)    PHYSICAL EXAM:  GENERAL: NAD, well-developed  HEAD:  Atraumatic, Normocephalic  EYES: EOMI, PERRLA, conjunctiva and sclera clear  NECK: Supple, No JVD  CHEST/LUNG: Clear to auscultation bilaterally; No wheeze  HEART: Regular rate and rhythm; No murmurs, rubs, or gallops  ABDOMEN: Soft, Nontender, Nondistended; Bowel sounds present  EXTREMITIES:  2+ Peripheral Pulses, No clubbing, cyanosis, or edema  PSYCH: AAOx3  NEUROLOGY: Able to name difficulty getting words out and sometimes pauses for some time.  Speech mild dysarthirc and low volume  No facial   Decreased sensation left face arm and leg  no drift in UE or LE  FTN NL  no neglect.     SKIN: No rashes or lesions

## 2018-07-21 NOTE — ED PROVIDER NOTE - NS ED ROS FT
ROS: No fever/chills, No headache/photophobia/eye pain/changes in vision, No ear pain/sore throat/dysphagia, No chest pain/palpitations, no SOB/cough/wheeze/stridor, No abdominal pain, No N/V/D/melena, no dysuria/frequency/discharge, No neck/back pain, no rash    +neuro deficits as hpi

## 2018-07-21 NOTE — ED PROVIDER NOTE - OBJECTIVE STATEMENT
52y/o F w/ hx no pmh presents for dysarthria, decrease sensation to l. face and l. upper extremity and l. droop started 45 mins ago.  no cp, sob. no vomiting, diarrhea.

## 2018-07-21 NOTE — ED ADULT NURSE NOTE - ED STAT RN HANDOFF DETAILS
Report given to ROSALBA Veras from ICU. Pt A&Ox4, +slurred speech, mild numbness to left facial and left upper arm, 5/5 strength on bilateral upper and lower extremities. I&Os documented. Vital signs stable. Pt completed TPA Administration, no s/s of bleeding noted. Education and emotional support provided to patient and family. Will continue to monitor. Report given to ROSALBA Veras from ICU. Pt A&Ox4, +slurred speech, mild numbness to left facial and left upper arm, 5/5 strength on bilateral upper and lower extremities. I&Os documented. Vital signs stable. Pt completed TPA Administration, no s/s of bleeding noted. Education and emotional support provided to patient and family. Will continue to monitor. endorsed to rn salvador to complete two more sets of vitals q15 minutes at 2120 & 2135

## 2018-07-21 NOTE — ED PROVIDER NOTE - MEDICAL DECISION MAKING DETAILS
I personally evaluated the patient. I reviewed the Resident’s or Physician Assistant’s note (as assigned above), and agree with the findings and plan except as documented in my note. Patient neuro status upon admission improved. Current NIH 1 for mild left lower half facial sensory deficit and lefta rm sensory deficit. aao x3. Case discussed with DR Giovani Navarro of the icu, case discussed with icu resident, will admit

## 2018-07-21 NOTE — H&P ADULT - HISTORY OF PRESENT ILLNESS
52yo woman with history of obesity, htn, gerd, hld, hpylori on treatment p/w  sudden onset of dizziness , blurry  vision and difficulty speaking around 6pm and came to [facility] within 1 hr.Patient was watching tv with  when she suddenly felt  dizzy and according to  had difficulty speaking and her speech was slurred. She mentions her dizziness is better but still feel funny sensation in arm.In ED she was slightly better able to speak more but did not resolve completely and improved to NIHSS 3. She was given tpa and is being admitted to icu

## 2018-07-21 NOTE — STROKE CODE NOTE - ASSESSMENT/PLAN
Patient with left sided numbness and slurred speech likely acute CVA and candidate for TPA.  Not candidate for intervention given low NIHSS; however if NIHSS increases from current score to >6 then would pursue CTA and CT perfusion  Discussed with patient and  about risks, benefits and alternative to TPA which inlcude morbidity and death.  They both understand the risks and  adds that he received TPA for a stroke in the past.  1. TPA 9mL IVP and 81mg over 60 minutes  2. Keep SBP <180 DBP  3. Neurochecks and vitals per post tpa protocol  4. No blood draws or antiplatelets or anticoagulation in 1st 24 hours  5. Dysphagia screen prior to PO meds  6. ECHO

## 2018-07-22 DIAGNOSIS — I10 ESSENTIAL (PRIMARY) HYPERTENSION: ICD-10-CM

## 2018-07-22 DIAGNOSIS — I63.9 CEREBRAL INFARCTION, UNSPECIFIED: ICD-10-CM

## 2018-07-22 DIAGNOSIS — E66.9 OBESITY, UNSPECIFIED: ICD-10-CM

## 2018-07-22 LAB
ANION GAP SERPL CALC-SCNC: 14 MMOL/L — SIGNIFICANT CHANGE UP (ref 7–14)
APTT BLD: 30.1 SEC — SIGNIFICANT CHANGE UP (ref 27–39.2)
BUN SERPL-MCNC: 11 MG/DL — SIGNIFICANT CHANGE UP (ref 10–20)
CALCIUM SERPL-MCNC: 8.8 MG/DL — SIGNIFICANT CHANGE UP (ref 8.5–10.1)
CHLORIDE SERPL-SCNC: 103 MMOL/L — SIGNIFICANT CHANGE UP (ref 98–110)
CHOLEST SERPL-MCNC: 209 MG/DL — HIGH (ref 100–200)
CO2 SERPL-SCNC: 25 MMOL/L — SIGNIFICANT CHANGE UP (ref 17–32)
CREAT SERPL-MCNC: 0.8 MG/DL — SIGNIFICANT CHANGE UP (ref 0.7–1.5)
GLUCOSE SERPL-MCNC: 104 MG/DL — HIGH (ref 70–99)
HCT VFR BLD CALC: 35.7 % — LOW (ref 37–47)
HDLC SERPL-MCNC: 48 MG/DL — SIGNIFICANT CHANGE UP (ref 40–125)
HGB BLD-MCNC: 11.6 G/DL — LOW (ref 12–16)
INR BLD: 1.19 RATIO — SIGNIFICANT CHANGE UP (ref 0.65–1.3)
LIPID PNL WITH DIRECT LDL SERPL: 133 MG/DL — HIGH (ref 4–129)
MAGNESIUM SERPL-MCNC: 2.1 MG/DL — SIGNIFICANT CHANGE UP (ref 1.8–2.4)
MCHC RBC-ENTMCNC: 25.7 PG — LOW (ref 27–31)
MCHC RBC-ENTMCNC: 32.5 G/DL — SIGNIFICANT CHANGE UP (ref 32–37)
MCV RBC AUTO: 79 FL — LOW (ref 81–99)
NRBC # BLD: 0 /100 WBCS — SIGNIFICANT CHANGE UP (ref 0–0)
PHOSPHATE SERPL-MCNC: 4.1 MG/DL — SIGNIFICANT CHANGE UP (ref 2.1–4.9)
PLATELET # BLD AUTO: 359 K/UL — SIGNIFICANT CHANGE UP (ref 130–400)
POTASSIUM SERPL-MCNC: 4.1 MMOL/L — SIGNIFICANT CHANGE UP (ref 3.5–5)
POTASSIUM SERPL-SCNC: 4.1 MMOL/L — SIGNIFICANT CHANGE UP (ref 3.5–5)
PROTHROM AB SERPL-ACNC: 12.8 SEC — SIGNIFICANT CHANGE UP (ref 9.95–12.87)
RBC # BLD: 4.52 M/UL — SIGNIFICANT CHANGE UP (ref 4.2–5.4)
RBC # FLD: 15.9 % — HIGH (ref 11.5–14.5)
SODIUM SERPL-SCNC: 142 MMOL/L — SIGNIFICANT CHANGE UP (ref 135–146)
TOTAL CHOLESTEROL/HDL RATIO MEASUREMENT: 4.4 RATIO — SIGNIFICANT CHANGE UP (ref 4–5.5)
TRIGL SERPL-MCNC: 198 MG/DL — HIGH (ref 10–149)
WBC # BLD: 5.51 K/UL — SIGNIFICANT CHANGE UP (ref 4.8–10.8)
WBC # FLD AUTO: 5.51 K/UL — SIGNIFICANT CHANGE UP (ref 4.8–10.8)

## 2018-07-22 RX ORDER — PANTOPRAZOLE SODIUM 20 MG/1
40 TABLET, DELAYED RELEASE ORAL
Qty: 0 | Refills: 0 | Status: DISCONTINUED | OUTPATIENT
Start: 2018-07-22 | End: 2018-07-27

## 2018-07-22 RX ORDER — LISINOPRIL 2.5 MG/1
10 TABLET ORAL DAILY
Qty: 0 | Refills: 0 | Status: DISCONTINUED | OUTPATIENT
Start: 2018-07-22 | End: 2018-07-27

## 2018-07-22 RX ORDER — ACETAMINOPHEN 500 MG
650 TABLET ORAL EVERY 6 HOURS
Qty: 0 | Refills: 0 | Status: DISCONTINUED | OUTPATIENT
Start: 2018-07-22 | End: 2018-07-27

## 2018-07-22 RX ORDER — PANTOPRAZOLE SODIUM 20 MG/1
40 TABLET, DELAYED RELEASE ORAL DAILY
Qty: 0 | Refills: 0 | Status: DISCONTINUED | OUTPATIENT
Start: 2018-07-22 | End: 2018-07-22

## 2018-07-22 RX ADMIN — Medication 200 MILLIGRAM(S): at 02:00

## 2018-07-22 RX ADMIN — Medication 650 MILLIGRAM(S): at 18:30

## 2018-07-22 RX ADMIN — PANTOPRAZOLE SODIUM 40 MILLIGRAM(S): 20 TABLET, DELAYED RELEASE ORAL at 14:35

## 2018-07-22 RX ADMIN — LISINOPRIL 10 MILLIGRAM(S): 2.5 TABLET ORAL at 11:40

## 2018-07-22 RX ADMIN — Medication 500 MILLIGRAM(S): at 02:30

## 2018-07-22 RX ADMIN — Medication 1000 MILLIGRAM(S): at 17:36

## 2018-07-22 RX ADMIN — Medication 650 MILLIGRAM(S): at 11:12

## 2018-07-22 RX ADMIN — Medication 650 MILLIGRAM(S): at 11:38

## 2018-07-22 RX ADMIN — Medication 650 MILLIGRAM(S): at 23:50

## 2018-07-22 RX ADMIN — Medication 650 MILLIGRAM(S): at 17:50

## 2018-07-22 RX ADMIN — ATORVASTATIN CALCIUM 80 MILLIGRAM(S): 80 TABLET, FILM COATED ORAL at 21:30

## 2018-07-22 NOTE — SWALLOW BEDSIDE ASSESSMENT ADULT - ORAL PREPARATORY PHASE
Decreased mastication ability Within functional limits Reduced oral grading/Decreased mastication ability

## 2018-07-22 NOTE — PROGRESS NOTE ADULT - ASSESSMENT
53 y f with pmh of hhtn, hld, gerd, prediabetes, hpylori on treatment p/w dizziness, blurry vision, lt arm numbness and facial numbness    #CVA:   Keep SBP <180 DBP  Neuro checks and vitals per post tpa protocol  Labs at 8 pm f/u  ECHO. lipid profile pending  repeat CT head 2 day      2) hpylori- s/s assess first then c/w amoxicillin, clarithromycin for 2 more days    3) hld- increase lipitor 80mg    4) htn- lisinopril 10 mg q 24 hrs    5) dvt ppx- scd  gi ppx  full code

## 2018-07-22 NOTE — SWALLOW BEDSIDE ASSESSMENT ADULT - COMMENTS
Dysphagia evaluation conducted bedside. Pt. alert, responsive, with generalized oral motor weakness. Normal breathing patterns observed Mild oral dysphagia; suspected pharyngeal dysphagia mild oral dysphagia moderate oral dysphagia

## 2018-07-22 NOTE — PROGRESS NOTE ADULT - SUBJECTIVE AND OBJECTIVE BOX
Over Night Events:    No major over night events    ROS:  See HPI    PHYSICAL EXAM    GENERAL: NAD, well-developed  NECK: Supple, No JVD  CHEST/LUNG: Clear to auscultation bilaterally; No wheeze  HEART: Regular rate and rhythm;   ABDOMEN: Soft, Nontender, Nondistended;   EXTREMITIES:  2+ Peripheral Pulses, No clubbing, cyanosis, or edema  NEUROLOGY:  Speech mild dysarthirc and low volume  No facial   Decreased sensation left face arm and leg  no drift in UE or LE      ICU Vital Signs Last 24 Hrs  T(C): 36.2 (22 Jul 2018 08:00), Max: 37.1 (21 Jul 2018 21:25)  T(F): 97.2 (22 Jul 2018 08:00), Max: 98.8 (21 Jul 2018 21:25)  HR: 62 (22 Jul 2018 11:30) (52 - 92)  BP: 139/70 (22 Jul 2018 11:30) (109/65 - 196/91)  BP(mean): 100 (22 Jul 2018 11:30) (76 - 121)  ABP: --  ABP(mean): --  RR: 15 (22 Jul 2018 11:30) (7 - 28)  SpO2: 98% (22 Jul 2018 11:30) (97% - 100%)      General:  HEENT: SANDRA             Lymph Nodes: No cervical LN   Lungs: Bilateral BS  Cardiovascular: Regular   Abdomen: Soft, Positive BS  Extremities: No clubbing   Skin: Warm  Neurological: Non focal       07-21-18 @ 07:01  -  07-22-18 @ 07:00  --------------------------------------------------------  IN:  Total IN: 0 mL    OUT:    Indwelling Catheter - Urethral: 1070 mL  Total OUT: 1070 mL    Total NET: -1070 mL      07-22-18 @ 07:01  -  07-22-18 @ 11:43  --------------------------------------------------------  IN:    Oral Fluid: 60 mL  Total IN: 60 mL    OUT:    Indwelling Catheter - Urethral: 200 mL  Total OUT: 200 mL    Total NET: -140 mL          LABS:                            12.8   6.33  )-----------( 408      ( 21 Jul 2018 18:00 )             39.3                                               07-21    140  |  99  |  13  ----------------------------<  130<H>  4.0   |  26  |  1.0    Ca    9.5      21 Jul 2018 18:00    TPro  7.7  /  Alb  4.3  /  TBili  0.3  /  DBili  x   /  AST  18  /  ALT  20  /  AlkPhos  93  07-21      PT/INR - ( 21 Jul 2018 18:00 )   PT: 10.90 sec;   INR: 1.01 ratio         PTT - ( 21 Jul 2018 18:00 )  PTT:33.0 sec                                           CARDIAC MARKERS ( 21 Jul 2018 18:00 )  x     / <0.01 ng/mL / 163 U/L / x     / x                                                LIVER FUNCTIONS - ( 21 Jul 2018 18:00 )  Alb: 4.3 g/dL / Pro: 7.7 g/dL / ALK PHOS: 93 U/L / ALT: 20 U/L / AST: 18 U/L / GGT: x                                                                                                                                       MEDICATIONS  (STANDING):  amoxicillin      Capsule 1000 milliGRAM(s) Oral two times a day  atorvastatin 80 milliGRAM(s) Oral at bedtime  lisinopril 10 milliGRAM(s) Oral daily  pantoprazole    Tablet 40 milliGRAM(s) Oral before breakfast    MEDICATIONS  (PRN):  acetaminophen   Tablet. 650 milliGRAM(s) Oral every 6 hours PRN Moderate Pain (4 - 6)      Xrays:                                                                                     ECHO

## 2018-07-22 NOTE — PROGRESS NOTE ADULT - SUBJECTIVE AND OBJECTIVE BOX
Over Night Events:  s/p TPA for stroke yestrday     still some dysarthia and difiiculty talking      Subjective:   52yo woman with history of obesity, borderline hypothyroidism, gerd here for sudden onset of dizziness and difficulty speaking.  Patient was watching tv with  when she suddenly felt  dizzy and according to  had difficulty speaking and her speech was slurred.  He brought her to the ED.  In ED she was slightly better able to speak more but did not resolve completely     PHYSICAL EXAM    ICU Vital Signs Last 24 Hrs  T(C): 36.2 (22 Jul 2018 08:00), Max: 37.1 (21 Jul 2018 21:25)  T(F): 97.2 (22 Jul 2018 08:00), Max: 98.8 (21 Jul 2018 21:25)  HR: 62 (22 Jul 2018 11:30) (52 - 92)  BP: 139/70 (22 Jul 2018 11:30) (109/65 - 196/91)  BP(mean): 100 (22 Jul 2018 11:30) (76 - 121)  ABP: --  ABP(mean): --  RR: 15 (22 Jul 2018 11:30) (7 - 28)  SpO2: 98% (22 Jul 2018 11:30) (97% - 100%)      General:AO x3 difficulty articulating  HEENT:           Nl     Lymph Nodes: NO cervical LN   Lungs: Bilateral BS  Cardiovascular: Regular   Abdomen: Soft, Positive BS  Extremities: No clubbing   Skin: Nl  Neurological: moves all extremities      LABS:                          12.8   6.33  )-----------( 408      ( 21 Jul 2018 18:00 )             39.3                                               07-21    140  |  99  |  13  ----------------------------<  130<H>  4.0   |  26  |  1.0    Ca    9.5      21 Jul 2018 18:00    TPro  7.7  /  Alb  4.3  /  TBili  0.3  /  DBili  x   /  AST  18  /  ALT  20  /  AlkPhos  93  07-21      PT/INR - ( 21 Jul 2018 18:00 )   PT: 10.90 sec;   INR: 1.01 ratio         PTT - ( 21 Jul 2018 18:00 )  PTT:33.0 sec                                           CARDIAC MARKERS ( 21 Jul 2018 18:00 )  x     / <0.01 ng/mL / 163 U/L / x     / x                                                LIVER FUNCTIONS - ( 21 Jul 2018 18:00 )  Alb: 4.3 g/dL / Pro: 7.7 g/dL / ALK PHOS: 93 U/L / ALT: 20 U/L / AST: 18 U/L / GGT: x                                                                                                                                       MEDICATIONS  (STANDING):  amoxicillin      Capsule 1000 milliGRAM(s) Oral two times a day  atorvastatin 80 milliGRAM(s) Oral at bedtime  lisinopril 10 milliGRAM(s) Oral daily  pantoprazole  Injectable 40 milliGRAM(s) IV Push daily    MEDICATIONS  (PRN):  acetaminophen   Tablet. 650 milliGRAM(s) Oral every 6 hours PRN Moderate Pain (4 - 6)      Xrays:                                                                                     ECHO    IMPRESSION:

## 2018-07-23 LAB
ALBUMIN SERPL ELPH-MCNC: 3.6 G/DL — SIGNIFICANT CHANGE UP (ref 3.5–5.2)
ALP SERPL-CCNC: 70 U/L — SIGNIFICANT CHANGE UP (ref 30–115)
ALT FLD-CCNC: 15 U/L — SIGNIFICANT CHANGE UP (ref 0–41)
ANION GAP SERPL CALC-SCNC: 12 MMOL/L — SIGNIFICANT CHANGE UP (ref 7–14)
AST SERPL-CCNC: 14 U/L — SIGNIFICANT CHANGE UP (ref 0–41)
BASOPHILS # BLD AUTO: 0.04 K/UL — SIGNIFICANT CHANGE UP (ref 0–0.2)
BASOPHILS NFR BLD AUTO: 0.8 % — SIGNIFICANT CHANGE UP (ref 0–1)
BILIRUB DIRECT SERPL-MCNC: <0.2 MG/DL — SIGNIFICANT CHANGE UP (ref 0–0.2)
BILIRUB INDIRECT FLD-MCNC: >0.3 MG/DL — SIGNIFICANT CHANGE UP (ref 0.2–1.2)
BILIRUB SERPL-MCNC: 0.5 MG/DL — SIGNIFICANT CHANGE UP (ref 0.2–1.2)
BUN SERPL-MCNC: 10 MG/DL — SIGNIFICANT CHANGE UP (ref 10–20)
CALCIUM SERPL-MCNC: 9.1 MG/DL — SIGNIFICANT CHANGE UP (ref 8.5–10.1)
CHLORIDE SERPL-SCNC: 101 MMOL/L — SIGNIFICANT CHANGE UP (ref 98–110)
CO2 SERPL-SCNC: 25 MMOL/L — SIGNIFICANT CHANGE UP (ref 17–32)
CREAT SERPL-MCNC: 0.9 MG/DL — SIGNIFICANT CHANGE UP (ref 0.7–1.5)
EOSINOPHIL # BLD AUTO: 0.17 K/UL — SIGNIFICANT CHANGE UP (ref 0–0.7)
EOSINOPHIL NFR BLD AUTO: 3.3 % — SIGNIFICANT CHANGE UP (ref 0–8)
GLUCOSE SERPL-MCNC: 110 MG/DL — HIGH (ref 70–99)
HCT VFR BLD CALC: 36.6 % — LOW (ref 37–47)
HGB BLD-MCNC: 11.8 G/DL — LOW (ref 12–16)
IMM GRANULOCYTES NFR BLD AUTO: 0.2 % — SIGNIFICANT CHANGE UP (ref 0.1–0.3)
LYMPHOCYTES # BLD AUTO: 2.72 K/UL — SIGNIFICANT CHANGE UP (ref 1.2–3.4)
LYMPHOCYTES # BLD AUTO: 53.1 % — HIGH (ref 20.5–51.1)
MAGNESIUM SERPL-MCNC: 2.1 MG/DL — SIGNIFICANT CHANGE UP (ref 1.8–2.4)
MCHC RBC-ENTMCNC: 25.8 PG — LOW (ref 27–31)
MCHC RBC-ENTMCNC: 32.2 G/DL — SIGNIFICANT CHANGE UP (ref 32–37)
MCV RBC AUTO: 80.1 FL — LOW (ref 81–99)
MONOCYTES # BLD AUTO: 0.44 K/UL — SIGNIFICANT CHANGE UP (ref 0.1–0.6)
MONOCYTES NFR BLD AUTO: 8.6 % — SIGNIFICANT CHANGE UP (ref 1.7–9.3)
NEUTROPHILS # BLD AUTO: 1.74 K/UL — SIGNIFICANT CHANGE UP (ref 1.4–6.5)
NEUTROPHILS NFR BLD AUTO: 34 % — LOW (ref 42.2–75.2)
NRBC # BLD: 0 /100 WBCS — SIGNIFICANT CHANGE UP (ref 0–0)
PHOSPHATE SERPL-MCNC: 4.8 MG/DL — SIGNIFICANT CHANGE UP (ref 2.1–4.9)
PLATELET # BLD AUTO: 347 K/UL — SIGNIFICANT CHANGE UP (ref 130–400)
POTASSIUM SERPL-MCNC: 4 MMOL/L — SIGNIFICANT CHANGE UP (ref 3.5–5)
POTASSIUM SERPL-SCNC: 4 MMOL/L — SIGNIFICANT CHANGE UP (ref 3.5–5)
PROT SERPL-MCNC: 6.6 G/DL — SIGNIFICANT CHANGE UP (ref 6–8)
RBC # BLD: 4.57 M/UL — SIGNIFICANT CHANGE UP (ref 4.2–5.4)
RBC # FLD: 15.9 % — HIGH (ref 11.5–14.5)
SODIUM SERPL-SCNC: 138 MMOL/L — SIGNIFICANT CHANGE UP (ref 135–146)
WBC # BLD: 5.12 K/UL — SIGNIFICANT CHANGE UP (ref 4.8–10.8)
WBC # FLD AUTO: 5.12 K/UL — SIGNIFICANT CHANGE UP (ref 4.8–10.8)

## 2018-07-23 PROCEDURE — 93880 EXTRACRANIAL BILAT STUDY: CPT | Mod: 26

## 2018-07-23 RX ORDER — ASPIRIN/CALCIUM CARB/MAGNESIUM 324 MG
81 TABLET ORAL DAILY
Qty: 0 | Refills: 0 | Status: DISCONTINUED | OUTPATIENT
Start: 2018-07-23 | End: 2018-07-25

## 2018-07-23 RX ORDER — ENOXAPARIN SODIUM 100 MG/ML
40 INJECTION SUBCUTANEOUS AT BEDTIME
Qty: 0 | Refills: 0 | Status: DISCONTINUED | OUTPATIENT
Start: 2018-07-23 | End: 2018-07-27

## 2018-07-23 RX ORDER — CLARITHROMYCIN 500 MG
500 TABLET ORAL EVERY 12 HOURS
Qty: 0 | Refills: 0 | Status: DISCONTINUED | OUTPATIENT
Start: 2018-07-23 | End: 2018-07-26

## 2018-07-23 RX ADMIN — ENOXAPARIN SODIUM 40 MILLIGRAM(S): 100 INJECTION SUBCUTANEOUS at 21:48

## 2018-07-23 RX ADMIN — Medication 650 MILLIGRAM(S): at 00:20

## 2018-07-23 RX ADMIN — ATORVASTATIN CALCIUM 80 MILLIGRAM(S): 80 TABLET, FILM COATED ORAL at 21:49

## 2018-07-23 RX ADMIN — Medication 81 MILLIGRAM(S): at 18:18

## 2018-07-23 RX ADMIN — Medication 1000 MILLIGRAM(S): at 18:16

## 2018-07-23 RX ADMIN — LISINOPRIL 10 MILLIGRAM(S): 2.5 TABLET ORAL at 06:08

## 2018-07-23 RX ADMIN — Medication 1000 MILLIGRAM(S): at 06:08

## 2018-07-23 RX ADMIN — PANTOPRAZOLE SODIUM 40 MILLIGRAM(S): 20 TABLET, DELAYED RELEASE ORAL at 06:08

## 2018-07-23 RX ADMIN — Medication 500 MILLIGRAM(S): at 18:19

## 2018-07-23 NOTE — SWALLOW BEDSIDE ASSESSMENT ADULT - SWALLOW EVAL: RECOMMENDED FEEDING/EATING TECHNIQUES
position upright (90 degrees)
maintain upright posture during/after eating for 30 mins/small sips/bites/position upright (90 degrees)

## 2018-07-23 NOTE — CONSULT NOTE ADULT - SUBJECTIVE AND OBJECTIVE BOX
Patient is a 53y old  Female who presents with a chief complaint of lt arm numbness (21 Jul 2018 20:40)    HPI:  54yo woman with history of obesity, htn, gerd, hld, hpylori on treatment p/w  sudden onset of dizziness , blurry  vision and difficulty speaking around 6pm and came to [facility] within 1 hr.Patient was watching tv with  when she suddenly felt  dizzy and according to  had difficulty speaking and her speech was slurred. She mentions her dizziness is better but still feel funny sensation in arm.In ED she was slightly better able to speak more but did not resolve completely and improved to NIHSS 3. She was given tpa and is being admitted to icu (21 Jul 2018 20:40)      PAST MEDICAL & SURGICAL HISTORY:  Prediabetes  GERD (gastroesophageal reflux disease)  Helicobacter pylori (H. pylori) infection  High cholesterol  HTN (hypertension)  S/P appendectomy      Hospital Course: uncomplicated- seen by speech diet advanced to [facility] soft with thins    TODAY'S SUBJECTIVE & REVIEW OF SYMPTOMS:     Constitutional Weakness   Cardio WNL   Resp WNL   GI WNL  Heme WNL  Endo WNL  Skin WNL  MSK WNL  Neuro WNL  Cognitive WNL  Psych WNL      MEDICATIONS  (STANDING):  amoxicillin      Capsule 1000 milliGRAM(s) Oral two times a day  aspirin enteric coated 81 milliGRAM(s) Oral daily  atorvastatin 80 milliGRAM(s) Oral at bedtime  clarithromycin 500 milliGRAM(s) Oral every 12 hours  enoxaparin Injectable 40 milliGRAM(s) SubCutaneous at bedtime  lisinopril 10 milliGRAM(s) Oral daily  pantoprazole    Tablet 40 milliGRAM(s) Oral before breakfast    MEDICATIONS  (PRN):  acetaminophen   Tablet. 650 milliGRAM(s) Oral every 6 hours PRN Moderate Pain (4 - 6)      FAMILY HISTORY:  Family history of stroke (Mother, Sibling)      Allergies    No Known Allergies    Intolerances        SOCIAL HISTORY:    [    ] Etoh  [    ] Smoking  [    ] Substance abuse     Home Environment:  [    ] Home Alone  [ x   ] Lives with Family  [    ] Home Health Aid    Dwelling:  [    ] Apartment  [   x ] Private House  [    ] Adult Home  [    ] Skilled Nursing Facility      [    ] Short Term  [    ] Long Term  [  x  ] Stairs                           [    ] Elevator     FUNCTIONAL STATUS PTA: (Check all that apply)  Ambulation: [ x    ]Independent    [    ] Dependent     [    ] Non-Ambulatory  Assistive Device: [    ] SA Cane  [    ]  Q Cane  [    ] Walker  [    ]  Wheelchair  ADL : [ x   ] Independent  [    ]  Dependent       Vital Signs Last 24 Hrs  T(C): 37 (23 Jul 2018 08:00), Max: 37 (23 Jul 2018 08:00)  T(F): 98.6 (23 Jul 2018 08:00), Max: 98.6 (23 Jul 2018 08:00)  HR: 70 (23 Jul 2018 12:00) (50 - 78)  BP: 118/63 (23 Jul 2018 12:00) (94/53 - 158/63)  BP(mean): 91 (23 Jul 2018 12:00) (67 - 102)  RR: 13 (23 Jul 2018 12:00) (8 - 34)  SpO2: 97% (23 Jul 2018 12:00) (96% - 99%)      PHYSICAL EXAM: Alert & Oriented X3  GENERAL: NAD, well-groomed, well-developed  HEAD:  Atraumatic, Normocephalic  EYES: EOMI, PERRLA, conjunctiva and sclera clear  NECK: Supple, No JVD, Normal thyroid  CHEST/LUNG: Clear bilaterally; No rales, rhonchi, wheezing, or rubs  HEART: Regular rate and rhythm; No murmurs, rubs, or gallops  ABDOMEN: Soft, Nontender, Nondistended; Bowel sounds present  EXTREMITIES:  2+ Peripheral Pulses, No clubbing, cyanosis, or edema    NERVOUS SYSTEM:  Cranial Nerves 2-12 intact [    ] Abnormal  [   x ]sl dysarthria-difficulty fully protruding tongue  ROM: WFL all extremities [  x  ]  Abnormal [     ]  Motor Strength: WFL all extremities  [    ]  Abnormal [x    ]Sl L sided weakness  Sensation: intact to light touch [  x  ] Abnormal [    ]      FUNCTIONAL STATUS:  Bed Mobility: [   ]  Independent [    ]  Supervision [x    ]  Needs Assistance [  ]  N/A  Transfers: [    ]  Independent [    ]  Supervision [  xx  ]  Needs Assistance [    ]  N/A    Ambulation:  [    ]  Independent [    ]  Supervision [ x  ]  Needs Assistance [    ]  N/A   ADL:  [    ]   Independent [ x   ] Requires Assistance [    ] N/A       LABS:                        11.8   5.12  )-----------( 347      ( 23 Jul 2018 04:30 )             36.6     07-23    138  |  101  |  10  ----------------------------<  110<H>  4.0   |  25  |  0.9    Ca    9.1      23 Jul 2018 04:30  Phos  4.8     07-23  Mg     2.1     07-23    TPro  6.6  /  Alb  3.6  /  TBili  0.5  /  DBili  <0.2  /  AST  14  /  ALT  15  /  AlkPhos  70  07-23    PT/INR - ( 22 Jul 2018 21:14 )   PT: 12.80 sec;   INR: 1.19 ratio         PTT - ( 22 Jul 2018 21:14 )  PTT:30.1 sec      RADIOLOGY & ADDITIONAL STUDIES:

## 2018-07-23 NOTE — SWALLOW BEDSIDE ASSESSMENT ADULT - ASR SWALLOW ASPIRATION MONITOR
pneumonia/throat clearing/upper respiratory infection/position upright (90Y)/cough/gurgly voice/fever
oral hygiene/position upright (90Y)

## 2018-07-23 NOTE — PROGRESS NOTE ADULT - ASSESSMENT
Patient is a 53y old Female who presents with a chief complaint of lt arm numbness (21 Jul 2018 20:40)    Currently admitted to medicine with the primary diagnosis of CVA (cerebral vascular accident)    Today is hospital day 2d.    BRIEF HOSPITAL COURSE:   52yo woman with history of obesity, borderline hypothyroidism, gerd on treatment for h.pylor, here for sudden onset of dizziness and difficulty speaking.  Patient was watching tv with  when she suddenly felt  dizzy and according to  had difficulty speaking and her speech was slurred. She came to the ED, was found to have CVA and was given systemic tPA on Saturday 7/21/18 at 8:00 pm. CT Head showed now hemorrhage or mass effect, which was stable on repeat head CT.       EVENTS LAST 24HRS:   No events last night. No sedation no pressors. No new onset weakness or numbness since admission, but slight residual left sided weakness. Able to tolerate PO diet.     PAST MEDICAL & SURGICAL HISTORY  Prediabetes  GERD (gastroesophageal reflux disease)  Helicobacter pylori (H. pylori) infection  High cholesterol  HTN (hypertension)  S/P appendectomy      SOCIAL HISTORY:      REVIEW OF SYSTEMS:  See HPI    ALLERGIES:    MEDICATIONS:  STANDING MEDICATIONS  amoxicillin      Capsule 1000 milliGRAM(s) Oral two times a day  aspirin enteric coated 81 milliGRAM(s) Oral daily  atorvastatin 80 milliGRAM(s) Oral at bedtime  clarithromycin 500 milliGRAM(s) Oral every 12 hours  enoxaparin Injectable 40 milliGRAM(s) SubCutaneous at bedtime  lisinopril 10 milliGRAM(s) Oral daily  pantoprazole    Tablet 40 milliGRAM(s) Oral before breakfast    PRN MEDICATIONS  acetaminophen   Tablet. 650 milliGRAM(s) Oral every 6 hours PRN    VITALS:         ICU Vital Signs Last 24 Hrs:    T(C): 37 (23 Jul 2018 08:00), Max: 37 (23 Jul 2018 08:00)  T(F): 98.6 (23 Jul 2018 08:00), Max: 98.6 (23 Jul 2018 08:00)  HR: 70 (23 Jul 2018 12:00) (50 - 78)  BP: 118/63 (23 Jul 2018 12:00) (94/53 - 158/63)  BP(mean): 91 (23 Jul 2018 12:00) (67 - 102)  ABP: --  ABP(mean): --  RR: 13 (23 Jul 2018 12:00) (8 - 34)  SpO2: 97% (23 Jul 2018 12:00) (96% - 99%)          I&O's Detail:      22 Jul 2018 07:01  -  23 Jul 2018 07:00  --------------------------------------------------------  IN:    Oral Fluid: 420 mL  Total IN: 420 mL    OUT:    Indwelling Catheter - Urethral: 200 mL    Voided: 550 mL  Total OUT: 750 mL    Total NET: -330 mL          LABS:                        11.8   5.12  )-----------( 347      ( 23 Jul 2018 04:30 )             36.6     07-23    138  |  101  |  10  ----------------------------<  110<H>  4.0   |  25  |  0.9    Ca    9.1      23 Jul 2018 04:30  Phos  4.8     07-23  Mg     2.1     07-23    TPro  6.6  /  Alb  3.6  /  TBili  0.5  /  DBili  <0.2  /  AST  14  /  ALT  15  /  AlkPhos  70  07-23      CARDIAC MARKERS ( 21 Jul 2018 18:00 )  x     / <0.01 ng/mL / 163 U/L / x     / x          CAPILLARY BLOOD GLUCOSE        PT/INR - ( 22 Jul 2018 21:14 )   PT: 12.80 sec;   INR: 1.19 ratio         PTT - ( 22 Jul 2018 21:14 )  PTT:30.1 sec    CULTURES:      PHYSICAL EXAM:    General: No acute distress.  Alert, oriented, interactive  HEENT: Pupils equal, reactive to light symmetrically.  PULM: Clear to auscultation bilaterally, no significant sputum production.  CVS: Regular rate and rhythm, no murmurs, rubs, or gallops.  ABD: Soft, nondistended, nontender, no masses.  EXT: No edema, nontender.  Neuro: Left side slightly weaker than right, but otherwise non-focal. Able to lift all extremities against gravity. Some facial droop on left side.  SKIN: Warm and well perfused, no rashes noted.    RADIOLOGY:   < from: Transthoracic Echocardiogram (07.22.18 @ 13:00) >  Summary:   1. LV Ejection Fraction by Hartmann's Method with a biplane EF of 70 %.   2. Normal left ventricular size and wall thicknesses, with normal   systolic function.   3. Mild tricuspid regurgitation.    < end of copied text >    < from: CT Head No Cont (07.22.18 @ 14:28) >  COMPARISON: CT head without contrast from 7/21/2018.    FINDINGS:    The ventricles and cortical sulci are within normal limits.     There is no evidence of acute intracranial hemorrhage or midline shift.    Gray-white differentiation is maintained.     The bones are intact.     Beam hardening artifact is noted overlying the brain stem and posterior   fossa which is inherent to CT in this location.    IMPRESSION:     No CT evidence of acute intracranial hemorrhage. Stable exam since   7/21/2018.          Assessment and Plan  53 y f with pmh of hhtn, hld, gerd, prediabetes, hpylori on treatment p/w dizziness, blurry vision, lt arm numbness and facial numbness    #CVA:   Keep SBP <180 DBP  Neuro checks q 4 hours  ECHO - WNL  - Carotid duplex  - Start ASA 81 mg x2      2) hpylori- c/w amoxicillin, clarithromycin for 2 more days  - Clarithryomycin non-formulary, ordered with pharmacist   - cw protonix PO    3) hld- increase lipitor 80mg    4) htn- lisinopril 10 mg q 24 hrs    5) dvt ppx-  lovenox SQ  gi ppx  activity- out of bed as tolerated  full code    plan to downgrade today

## 2018-07-23 NOTE — CONSULT NOTE ADULT - ASSESSMENT
IMPRESSION: Rehab of CVA L Dimitry    PRECAUTIONS: [    ] Cardiac  [    ] Respiratory  [    ] Seizures [    ] Contact Isolation  [    ] Droplet Isolation  [    ] Other    Weight Bearing Status:     RECOMMENDATION:    Out of Bed to Chair     DVT/Decubiti Prophylaxis    REHAB PLAN:     [  x   ] Bedside P/T 3-5 times a week   [  x   ] Bedside O/T  2-3 times a week   [     ] No Rehab Therapy Indicated   [     ]  Speech Therapy   Conditioning/ROM                                 ADL  Bed Mobility                                            Conditioning/ROM  Transfers                                                  Bed Mobility  Sitting /Standing Balance                      Transfers                                        Gait Training                                            Sitting/Standing Balance  Stair Training [ x  ]Applicable                 Home equipment Eval                                                                     Splinting  [   ] Only      GOALS:   ADL   [  x  ]   Independent         Transfers  [  x  ] Independent            Ambulation  [  x   ] Independent     [   x  ] With device                            [    ]  CG                                               [    ]  CG                                                    [     ] CG                            [    ] Min A                                          [    ] Min A                                                [     ] Min  A          DISCHARGE PLAN:   [  x   ]  Good candidate for Intensive Rehabilitation/Hospital based-[facility]A [facility]                                             Will tolerate 3hrs Intensive Rehab Daily                                       [      ]  Short Term Rehab in Skilled Nursing Facility                                       [      ]  Home with Outpatient or  services                                         [      ]  Possible Candidate for Intensive Hospital based Rehab

## 2018-07-23 NOTE — CHART NOTE - NSCHARTNOTEFT_GEN_A_CORE
52yo woman with history of obesity, borderline hypothyroidism, gerd on treatment for h.pylor, here for sudden onset of dizziness and difficulty speaking.  Patient was watching tv with  when she suddenly felt  dizzy and according to  had difficulty speaking and her speech was slurred. She came to the ED, was found to have CVA and was given systemic tPA on Saturday 7/21/18 at 8:00 pm. CT Head showed no acute intracranial hemorrhage or mass effect, which was stable on repeat head CT. She was started on ASA and high intensity statin.        Assessment and Plan  53 y f with pmh of hhtn, hld, gerd, prediabetes, hpylori on treatment p/w dizziness, blurry vision, lt arm numbness and facial numbness    #CVA:   - Neuo exam significant for slight residual left sided weakness, specifically left sided facial droop; however she is able to raise both legs and arm against gravity  Keep SBP <180 DBP  Neuro checks q 4 hours  ECHO - WNL  - increase activity as tolerated  - FU Carotid duplex results  - Start ASA 81 mg x2  and cw high intensity statin    2) hpylori- c/w amoxicillin, clarithromycin for 2 more days  - Clarithryomycin non-formulary, ordered with pharmacist   - cw protonix PO    3) hld- increase lipitor 80mg    4) htn- lisinopril 10 mg q 24 hrs    5) dvt ppx-  lovenox SQ  gi ppx  - diet: as per speech and swallow. on soft mechanical   activity- out of bed as tolerated  full code    plan to downgrade today

## 2018-07-23 NOTE — PROGRESS NOTE ADULT - SUBJECTIVE AND OBJECTIVE BOX
Over Night Events: NO new events.  Feels better.  Slight left residual weakness.  Tolerating PO         ROS:  See HPI    PHYSICAL EXAM    ICU Vital Signs Last 24 Hrs  T(C): 37 (23 Jul 2018 08:00), Max: 37 (23 Jul 2018 08:00)  T(F): 98.6 (23 Jul 2018 08:00), Max: 98.6 (23 Jul 2018 08:00)  HR: 58 (23 Jul 2018 08:00) (50 - 70)  BP: 126/70 (23 Jul 2018 08:00) (94/53 - 158/63)  BP(mean): 97 (23 Jul 2018 08:00) (67 - 104)  ABP: --  ABP(mean): --  RR: 13 (23 Jul 2018 08:00) (8 - 19)  SpO2: 98% (23 Jul 2018 08:00) (96% - 100%)      General: In NAD   HEENT: SANDRA             Lymph Nodes: No cervical LN   Lungs: Bilateral BS  Cardiovascular: Regular   Abdomen: Soft, Positive BS  Extremities: No clubbing   Skin: Warm  Neurological: Sligjht left sided weakness       07-22-18 @ 07:01  -  07-23-18 @ 07:00  --------------------------------------------------------  IN:    Oral Fluid: 420 mL  Total IN: 420 mL    OUT:    Indwelling Catheter - Urethral: 200 mL    Voided: 550 mL  Total OUT: 750 mL    Total NET: -330 mL          LABS:                            11.8   5.12  )-----------( 347      ( 23 Jul 2018 04:30 )             36.6                                               07-23    138  |  101  |  10  ----------------------------<  110<H>  4.0   |  25  |  0.9    Ca    9.1      23 Jul 2018 04:30  Phos  4.8     07-23  Mg     2.1     07-23    TPro  6.6  /  Alb  3.6  /  TBili  0.5  /  DBili  <0.2  /  AST  14  /  ALT  15  /  AlkPhos  70  07-23      PT/INR - ( 22 Jul 2018 21:14 )   PT: 12.80 sec;   INR: 1.19 ratio         PTT - ( 22 Jul 2018 21:14 )  PTT:30.1 sec                                           CARDIAC MARKERS ( 21 Jul 2018 18:00 )  x     / <0.01 ng/mL / 163 U/L / x     / x                                                LIVER FUNCTIONS - ( 23 Jul 2018 04:30 )  Alb: 3.6 g/dL / Pro: 6.6 g/dL / ALK PHOS: 70 U/L / ALT: 15 U/L / AST: 14 U/L / GGT: x                                                                                                                                       MEDICATIONS  (STANDING):  amoxicillin      Capsule 1000 milliGRAM(s) Oral two times a day  atorvastatin 80 milliGRAM(s) Oral at bedtime  lisinopril 10 milliGRAM(s) Oral daily  pantoprazole    Tablet 40 milliGRAM(s) Oral before breakfast    MEDICATIONS  (PRN):  acetaminophen   Tablet. 650 milliGRAM(s) Oral every 6 hours PRN Moderate Pain (4 - 6)      Xrays:                                                                                     ECHO

## 2018-07-23 NOTE — PROGRESS NOTE ADULT - ASSESSMENT
IMPRESSION:   CVA sp TPA.  Improved     PLAN:    CNS: FU with Neuro.  Start ASA. Carotid duplex.      HEENT: Oral care    PULMONARY:  HOB @ 45 degrees    CARDIOVASCULAR: I=O.      GI: GI prophylaxis.  Feeding per speech and swallow     RENAL:  Follow up lytes.  Correct as needed    INFECTIOUS DISEASE: Follow up cultures    HEMATOLOGICAL:  DVT prophylaxis.    ENDOCRINE:  Follow up FS.      MUSCULOSKELETAL:    OOB to chair    Transfer to floor

## 2018-07-24 LAB
ANION GAP SERPL CALC-SCNC: 12 MMOL/L — SIGNIFICANT CHANGE UP (ref 7–14)
BUN SERPL-MCNC: 11 MG/DL — SIGNIFICANT CHANGE UP (ref 10–20)
CALCIUM SERPL-MCNC: 9.1 MG/DL — SIGNIFICANT CHANGE UP (ref 8.5–10.1)
CHLORIDE SERPL-SCNC: 100 MMOL/L — SIGNIFICANT CHANGE UP (ref 98–110)
CO2 SERPL-SCNC: 28 MMOL/L — SIGNIFICANT CHANGE UP (ref 17–32)
CREAT SERPL-MCNC: 1 MG/DL — SIGNIFICANT CHANGE UP (ref 0.7–1.5)
GLUCOSE SERPL-MCNC: 100 MG/DL — HIGH (ref 70–99)
HCT VFR BLD CALC: 38 % — SIGNIFICANT CHANGE UP (ref 37–47)
HGB BLD-MCNC: 12.1 G/DL — SIGNIFICANT CHANGE UP (ref 12–16)
MAGNESIUM SERPL-MCNC: 2.2 MG/DL — SIGNIFICANT CHANGE UP (ref 1.8–2.4)
MCHC RBC-ENTMCNC: 25.7 PG — LOW (ref 27–31)
MCHC RBC-ENTMCNC: 31.8 G/DL — LOW (ref 32–37)
MCV RBC AUTO: 80.7 FL — LOW (ref 81–99)
NRBC # BLD: 0 /100 WBCS — SIGNIFICANT CHANGE UP (ref 0–0)
PLATELET # BLD AUTO: 335 K/UL — SIGNIFICANT CHANGE UP (ref 130–400)
POTASSIUM SERPL-MCNC: 4.2 MMOL/L — SIGNIFICANT CHANGE UP (ref 3.5–5)
POTASSIUM SERPL-SCNC: 4.2 MMOL/L — SIGNIFICANT CHANGE UP (ref 3.5–5)
RBC # BLD: 4.71 M/UL — SIGNIFICANT CHANGE UP (ref 4.2–5.4)
RBC # FLD: 15.9 % — HIGH (ref 11.5–14.5)
SODIUM SERPL-SCNC: 140 MMOL/L — SIGNIFICANT CHANGE UP (ref 135–146)
WBC # BLD: 5.91 K/UL — SIGNIFICANT CHANGE UP (ref 4.8–10.8)
WBC # FLD AUTO: 5.91 K/UL — SIGNIFICANT CHANGE UP (ref 4.8–10.8)

## 2018-07-24 RX ADMIN — Medication 500 MILLIGRAM(S): at 06:00

## 2018-07-24 RX ADMIN — Medication 1000 MILLIGRAM(S): at 18:14

## 2018-07-24 RX ADMIN — LISINOPRIL 10 MILLIGRAM(S): 2.5 TABLET ORAL at 05:58

## 2018-07-24 RX ADMIN — Medication 500 MILLIGRAM(S): at 18:16

## 2018-07-24 RX ADMIN — Medication 81 MILLIGRAM(S): at 11:57

## 2018-07-24 RX ADMIN — ATORVASTATIN CALCIUM 80 MILLIGRAM(S): 80 TABLET, FILM COATED ORAL at 21:49

## 2018-07-24 RX ADMIN — PANTOPRAZOLE SODIUM 40 MILLIGRAM(S): 20 TABLET, DELAYED RELEASE ORAL at 06:00

## 2018-07-24 RX ADMIN — Medication 1000 MILLIGRAM(S): at 05:59

## 2018-07-24 RX ADMIN — ENOXAPARIN SODIUM 40 MILLIGRAM(S): 100 INJECTION SUBCUTANEOUS at 21:49

## 2018-07-24 NOTE — PROGRESS NOTE ADULT - ASSESSMENT
52yo woman with history of obesity, borderline hypothyroidism, gerd on treatment for h.pylor, here for sudden onset of dizziness and difficulty speaking.  Patient was watching tv with  when she suddenly felt  dizzy and according to  had difficulty speaking and her speech was slurred. She came to the ED, was found to have CVA and was given systemic tPA on Saturday 7/21/18 at 8:00 pm. CT Head showed now hemorrhage or mass effect, which was stable on repeat head CT.     1.Transient Ischemic Attack  -Close monitoring of blood pressure and on neuro-checks  -ECHO and Carotid duplex within normal limit  -On ASA 81 mg and increased lipitor to 80mg  - PT following the patient, will request speech and swallow to guide us with her swallowing issue    2. H. Pylori Infection  - c/w amoxicillin, clarithromycin for 1 more day  - on protonix PO    3Hypertension  - On lisinopril 10 mg q 24 hrs    On dvt ppx with lovenox SQ, gi ppx  activity- ambulating with the help of PT on the floor  full code 54 yo woman with history of obesity, borderline hypothyroidism, gerd on treatment for h.pylor, here for sudden onset of dizziness and difficulty speaking.  Patient was watching tv with  when she suddenly felt  dizzy and according to  had difficulty speaking and her speech was slurred. She came to the ED, was found to have CVA and was given systemic tPA on Saturday 7/21/18 at 8:00 pm. CT Head showed now hemorrhage or mass effect, which was stable on repeat head CT.     1. Acute C.V.A  -Resolved, with some difficulty in swallowing  -S/p T.P.A, currently on ASA 81 mg and increased lipitor to 80mg  -Close monitoring of blood pressure and on neuro-checks  -ECHO, CT head and Carotid duplex within normal limit  - PT following the patient, will request speech and swallow to guide us with her swallowing issue    2. H. Pylori Infection  - c/w amoxicillin, clarithromycin for 1 more day  - on protonix PO    3. Hypertension  - On lisinopril 10 mg q 24 hrs    On dvt ppx with lovenox SQ, gi ppx  activity- ambulating with the help of PT on the floor  full code

## 2018-07-24 NOTE — PROGRESS NOTE ADULT - SUBJECTIVE AND OBJECTIVE BOX
SUBJECTIVE:    Patient is a 53y old Female who presents with a chief complaint of lt arm numbness (21 Jul 2018 20:40)    Currently admitted to medicine with the primary diagnosis of CVA (cerebral vascular accident)     Today is hospital day 3d. This morning she is resting comfortably in bed and reports no new issues or overnight events.     PAST MEDICAL & SURGICAL HISTORY  Prediabetes  GERD (gastroesophageal reflux disease)  Helicobacter pylori (H. pylori) infection  High cholesterol  HTN (hypertension)  S/P appendectomy    SOCIAL HISTORY:  Negative for smoking/alcohol/drug use.     ALLERGIES:  No Known Allergies    MEDICATIONS:  STANDING MEDICATIONS  amoxicillin      Capsule 1000 milliGRAM(s) Oral two times a day  aspirin enteric coated 81 milliGRAM(s) Oral daily  atorvastatin 80 milliGRAM(s) Oral at bedtime  clarithromycin 500 milliGRAM(s) Oral every 12 hours  enoxaparin Injectable 40 milliGRAM(s) SubCutaneous at bedtime  lisinopril 10 milliGRAM(s) Oral daily  pantoprazole    Tablet 40 milliGRAM(s) Oral before breakfast    PRN MEDICATIONS  acetaminophen   Tablet. 650 milliGRAM(s) Oral every 6 hours PRN    VITALS:   T(F): 97.5  HR: 71  BP: 143/76  RR: 18  SpO2: 99%    LABS:                        12.1   5.91  )-----------( 335      ( 24 Jul 2018 06:05 )             38.0     07-24    140  |  100  |  11  ----------------------------<  100<H>  4.2   |  28  |  1.0    Ca    9.1      24 Jul 2018 06:05  Phos  4.8     07-23  Mg     2.2     07-24    TPro  6.6  /  Alb  3.6  /  TBili  0.5  /  DBili  <0.2  /  AST  14  /  ALT  15  /  AlkPhos  70  07-23    PT/INR - ( 22 Jul 2018 21:14 )   PT: 12.80 sec;   INR: 1.19 ratio         PTT - ( 22 Jul 2018 21:14 )  PTT:30.1 sec              RADIOLOGY:    CT Head No Cont (07.22.18 @ 14:28) >  No CT evidence of acute intracranial hemorrhage. Stable exam since   7/21/2018.    Transthoracic Echocardiogram (07.22.18 @ 13:00) >  1. LV Ejection Fraction by Hartmann's Method with a biplane EF of 70 %.   2. Normal left ventricular size and wall thicknesses, with normal   systolic function.   3. Mild tricuspid regurgitation.    VA Duplex Carotid, Bilat (07.23.18 @ 19:51) >  20-39% stenosis of the right internal carotid artery.  20-39% stenosis of the left internal carotid artery.    PHYSICAL EXAM:    GEN: No acute distress, difficulty swallowing at normal pace  LUNGS: Breath sounds bilaterally   HEART: S1/S2 present. RRR.   ABD: Soft, non-tender, non-distended. Obese, weak bowel sounds  EXT: No edema, cyanosis or clubbing  NEURO: AAOX3

## 2018-07-24 NOTE — PHYSICAL THERAPY INITIAL EVALUATION ADULT - LIVES WITH, PROFILE
pt lives in pvt home, has steps to enter, steps in home. Pt reports hx of B knee OA, has difficulty with stair negotiation./spouse

## 2018-07-24 NOTE — PHYSICAL THERAPY INITIAL EVALUATION ADULT - ACTIVE RANGE OF MOTION EXAMINATION, REHAB EVAL
no Active ROM deficits were identified/bilateral  lower extremity Active ROM was WFL (within functional limits)

## 2018-07-24 NOTE — PHYSICAL THERAPY INITIAL EVALUATION ADULT - SPECIFY REASON(S)
Pt can ambulate and transfer with close supervision with family / staff. Stairs assessed, pt has hx of B knee OA, and reports had difficulty with stair negotiation prior to admission.

## 2018-07-24 NOTE — OCCUPATIONAL THERAPY INITIAL EVALUATION ADULT - SPECIFY REASON(S)
Pt not currently in room. As per pt spouse, pt is in the shower room. Will follow up to complete OT eval when pt available.

## 2018-07-25 LAB
ALBUMIN SERPL ELPH-MCNC: 3.8 G/DL — SIGNIFICANT CHANGE UP (ref 3.5–5.2)
ALP SERPL-CCNC: 78 U/L — SIGNIFICANT CHANGE UP (ref 30–115)
ALT FLD-CCNC: 18 U/L — SIGNIFICANT CHANGE UP (ref 0–41)
ANION GAP SERPL CALC-SCNC: 15 MMOL/L — HIGH (ref 7–14)
AST SERPL-CCNC: 17 U/L — SIGNIFICANT CHANGE UP (ref 0–41)
BASOPHILS # BLD AUTO: 0.04 K/UL — SIGNIFICANT CHANGE UP (ref 0–0.2)
BASOPHILS NFR BLD AUTO: 0.7 % — SIGNIFICANT CHANGE UP (ref 0–1)
BILIRUB SERPL-MCNC: 0.4 MG/DL — SIGNIFICANT CHANGE UP (ref 0.2–1.2)
BUN SERPL-MCNC: 11 MG/DL — SIGNIFICANT CHANGE UP (ref 10–20)
CALCIUM SERPL-MCNC: 8.8 MG/DL — SIGNIFICANT CHANGE UP (ref 8.5–10.1)
CHLORIDE SERPL-SCNC: 99 MMOL/L — SIGNIFICANT CHANGE UP (ref 98–110)
CK MB CFR SERPL CALC: <1 NG/ML — SIGNIFICANT CHANGE UP (ref 0.6–6.3)
CK SERPL-CCNC: 101 U/L — SIGNIFICANT CHANGE UP (ref 0–225)
CO2 SERPL-SCNC: 26 MMOL/L — SIGNIFICANT CHANGE UP (ref 17–32)
CREAT SERPL-MCNC: 0.9 MG/DL — SIGNIFICANT CHANGE UP (ref 0.7–1.5)
EOSINOPHIL # BLD AUTO: 0.15 K/UL — SIGNIFICANT CHANGE UP (ref 0–0.7)
EOSINOPHIL NFR BLD AUTO: 2.6 % — SIGNIFICANT CHANGE UP (ref 0–8)
ESTIMATED AVERAGE GLUCOSE: 137 MG/DL — HIGH (ref 68–114)
GLUCOSE SERPL-MCNC: 104 MG/DL — HIGH (ref 70–99)
HBA1C BLD-MCNC: 6.4 % — HIGH (ref 4–5.6)
HCT VFR BLD CALC: 35.2 % — LOW (ref 37–47)
HGB BLD-MCNC: 11.5 G/DL — LOW (ref 12–16)
IMM GRANULOCYTES NFR BLD AUTO: 0.2 % — SIGNIFICANT CHANGE UP (ref 0.1–0.3)
LYMPHOCYTES # BLD AUTO: 2.82 K/UL — SIGNIFICANT CHANGE UP (ref 1.2–3.4)
LYMPHOCYTES # BLD AUTO: 48.6 % — SIGNIFICANT CHANGE UP (ref 20.5–51.1)
MAGNESIUM SERPL-MCNC: 1.9 MG/DL — SIGNIFICANT CHANGE UP (ref 1.8–2.4)
MCHC RBC-ENTMCNC: 25.8 PG — LOW (ref 27–31)
MCHC RBC-ENTMCNC: 32.7 G/DL — SIGNIFICANT CHANGE UP (ref 32–37)
MCV RBC AUTO: 78.9 FL — LOW (ref 81–99)
MONOCYTES # BLD AUTO: 0.43 K/UL — SIGNIFICANT CHANGE UP (ref 0.1–0.6)
MONOCYTES NFR BLD AUTO: 7.4 % — SIGNIFICANT CHANGE UP (ref 1.7–9.3)
NEUTROPHILS # BLD AUTO: 2.35 K/UL — SIGNIFICANT CHANGE UP (ref 1.4–6.5)
NEUTROPHILS NFR BLD AUTO: 40.5 % — LOW (ref 42.2–75.2)
PLATELET # BLD AUTO: 340 K/UL — SIGNIFICANT CHANGE UP (ref 130–400)
POTASSIUM SERPL-MCNC: 4.2 MMOL/L — SIGNIFICANT CHANGE UP (ref 3.5–5)
POTASSIUM SERPL-SCNC: 4.2 MMOL/L — SIGNIFICANT CHANGE UP (ref 3.5–5)
PROT SERPL-MCNC: 6.7 G/DL — SIGNIFICANT CHANGE UP (ref 6–8)
RBC # BLD: 4.46 M/UL — SIGNIFICANT CHANGE UP (ref 4.2–5.4)
RBC # FLD: 15.7 % — HIGH (ref 11.5–14.5)
SODIUM SERPL-SCNC: 140 MMOL/L — SIGNIFICANT CHANGE UP (ref 135–146)
TROPONIN T SERPL-MCNC: <0.01 NG/ML — SIGNIFICANT CHANGE UP
WBC # BLD: 5.8 K/UL — SIGNIFICANT CHANGE UP (ref 4.8–10.8)
WBC # FLD AUTO: 5.8 K/UL — SIGNIFICANT CHANGE UP (ref 4.8–10.8)

## 2018-07-25 RX ORDER — ASPIRIN/CALCIUM CARB/MAGNESIUM 324 MG
162 TABLET ORAL DAILY
Qty: 0 | Refills: 0 | Status: DISCONTINUED | OUTPATIENT
Start: 2018-07-25 | End: 2018-07-25

## 2018-07-25 RX ORDER — ASPIRIN/CALCIUM CARB/MAGNESIUM 324 MG
162 TABLET ORAL DAILY
Qty: 0 | Refills: 0 | Status: DISCONTINUED | OUTPATIENT
Start: 2018-07-25 | End: 2018-07-27

## 2018-07-25 RX ADMIN — Medication 1000 MILLIGRAM(S): at 17:29

## 2018-07-25 RX ADMIN — Medication 162 MILLIGRAM(S): at 11:22

## 2018-07-25 RX ADMIN — Medication 500 MILLIGRAM(S): at 17:29

## 2018-07-25 RX ADMIN — Medication 500 MILLIGRAM(S): at 05:46

## 2018-07-25 RX ADMIN — Medication 650 MILLIGRAM(S): at 03:30

## 2018-07-25 RX ADMIN — ATORVASTATIN CALCIUM 80 MILLIGRAM(S): 80 TABLET, FILM COATED ORAL at 21:31

## 2018-07-25 RX ADMIN — LISINOPRIL 10 MILLIGRAM(S): 2.5 TABLET ORAL at 05:45

## 2018-07-25 RX ADMIN — PANTOPRAZOLE SODIUM 40 MILLIGRAM(S): 20 TABLET, DELAYED RELEASE ORAL at 06:42

## 2018-07-25 RX ADMIN — Medication 650 MILLIGRAM(S): at 03:00

## 2018-07-25 RX ADMIN — Medication 1000 MILLIGRAM(S): at 05:45

## 2018-07-25 RX ADMIN — ENOXAPARIN SODIUM 40 MILLIGRAM(S): 100 INJECTION SUBCUTANEOUS at 21:31

## 2018-07-25 NOTE — PROGRESS NOTE ADULT - ASSESSMENT
53 year old woman with history of obesity, borderline hypothyroidism, gerd and on treatment for H.Pylori, comes for sudden onset of dizziness and difficulty speaking.  Patient was watching tv with  when she suddenly felt dizzy and according to  had difficulty speaking and her speech was slurred. She came to the ED, her NIHSS was 3 and she was given systemic tPA on Saturday 7/21/18 at 8:00 pm. CT Head showed no hemorrhage or mass effect, which was stable on repeat head CT.     1. Acute C.V.A  - Resolved, with some difficulty in swallowing and 4/5 muscle strength on the left side  - S/p T.P.A, currently on increased dose of Aspirin 162 mg and increased dose of lipitor 80mg  - Close monitoring of blood pressure and on neuro-checks. Last BP was 121/69  - ECHO showed mld T.R and EF of 70 %, CT head showed no evidence of Acute I.C.A and Carotid duplex showed 20-39 % stenosis  - PT following the patient and patient walking with the therapist help in the hallway.  - Speech and swallow following her for swallowing issue    2. H. Pylori Infection  - last day of amoxicillin, clarithromycin  - on protonix PO    3. Hypertension  - On lisinopril 10 mg q 24 hrs    On dvt ppx with lovenox SQ, gi ppx  activity- ambulating with the help of PT on the floor  full code 53 year old woman with history of obesity, borderline hypothyroidism, gerd and on treatment for H.Pylori, comes for sudden onset of dizziness and difficulty speaking.  Patient was watching tv with  when she suddenly felt dizzy and according to  had difficulty speaking and her speech was slurred. She came to the ED, her NIHSS was 3 and she was given systemic tPA on Saturday 7/21/18 at 8:00 pm. CT Head showed no hemorrhage or mass effect, which was stable on repeat head CT.     1. Acute C.V.A  - Resolved, with some difficulty in swallowing and 4/5 muscle strength on the left side  - S/p T.P.A, currently on increased dose of Aspirin 162 mg and increased dose of lipitor 80mg  - Close monitoring of blood pressure and on neuro-checks. Last BP was 121/69  - ECHO showed mld T.R and EF of 70 %, CT head showed no evidence of Acute I.C.A and Carotid duplex showed 20-39 % stenosis  - PT following the patient and patient walking with the therapist help in the hallway.  - Speech and swallow following her for swallowing issue    2. H. Pylori Infection  - last day of amoxicillin, clarithromycin  - on protonix PO    3. Hypertension  - On lisinopril 10 mg q 24 hrs    4. Morbid Obesity with BMI of 40  - dietary counseling especially in light of cva/htn    On dvt ppx with lovenox SQ, gi ppx  activity- ambulating with the help of PT on the floor  full code

## 2018-07-25 NOTE — SWALLOW BEDSIDE ASSESSMENT ADULT - ASR SWALLOW LABIAL MOBILITY
impaired pursing/impaired coordination/impaired retraction/impaired seal
impaired seal/impaired retraction/impaired pursing/impaired coordination

## 2018-07-25 NOTE — CHART NOTE - NSCHARTNOTEFT_GEN_A_CORE
Called by RN to evaluate patient. Patient stated that since 12am at midnight, she has been experiencing chest tightness, palpitation, and shortness of breath. She felt asleep and then woke up with worsening symptoms. The chest tightness is located at midsternal area, non-radiating, with no aggravating or alleviating factors, and is associated with sensation of palpitation and shortness of breath. Patient also complained of "a funny feeling" on her left forearm and hand, and it felt similar to the sensation she had prior the CVA. Patient also had right shoulder pain. Patient denies any dizziness, blurry vision, difficult swallowing, abdominal pain, other new sensory deficit or motor deficit, or any anxiety.     Physical Exam  General: in mild distress, alert and oriented to person, place, and time  HEENT: NCAT  Cardiovascular: regular rate and rhythm, no murmurs  Pulmonary: CTA b/l  Abdomen: soft, obese abdomen, nontender to palpation  MSK: left shoulder pain without point tenderness  Neuro: alert and oriented to time, person and place, CN exam significant for left sided facial drip. Strength 4/5 on left UE, 5/5 on RLE and bilateral LE, babinski negative, pronator drift negative, heel to shin and finger to nose grossly intact R>L      Assessment/Plan  Likely anxiety Called by RN to evaluate patient. Patient stated that since 12am at midnight, she has been experiencing chest tightness, palpitation, and shortness of breath. She felt asleep and then woke up with worsening symptoms. The chest tightness is located at midsternal area, non-radiating, with no aggravating or alleviating factors, and is associated with sensation of palpitation and shortness of breath. Patient also complained of "a funny feeling" on her left forearm and hand, and it felt similar to the sensation she had prior the CVA. Patient also had right shoulder pain. Patient denies any dizziness, blurry vision, difficult swallowing, abdominal pain, other new sensory deficit or motor deficit, or any anxiety.     Physical Exam  Vitals: T96.3, /82, HR 89, O2 sat 100% on RA  General: in mild distress, alert and oriented to person, place, and time  HEENT: NCAT  Cardiovascular: regular rate and rhythm, no murmurs  Pulmonary: CTA b/l  Abdomen: soft, obese abdomen, nontender to palpation  MSK: left shoulder pain without point tenderness  Neuro: alert and oriented to time, person and place, CN exam significant for left sided facial drip. Strength 4/5 on left UE, 5/5 on RLE and bilateral LE, babinski negative, pronator drift negative, heel to shin and finger to nose grossly intact R>L      Assessment/Plan  Likely anxiety, rule out ACS  STAT EKG shows NSR, no change from prior ones  STAT CE ordered, pending result  Tylenol PRN for headache  No new deficit found, so will not repeat CT head at this time  Will repeat CTH if new deficit found or worsening symptoms  Continue to follow

## 2018-07-25 NOTE — SWALLOW BEDSIDE ASSESSMENT ADULT - ASR SWALLOW LINGUAL MOBILITY
impaired anterior elevation/impaired left lateral movement/impaired protrusion
impaired protrusion/impaired anterior elevation/impaired left lateral movement/impaired right lateral movement

## 2018-07-25 NOTE — PROGRESS NOTE ADULT - SUBJECTIVE AND OBJECTIVE BOX
Neurology Consult    Patient is a 53y old  Female who presents with a chief complaint of lt arm numbness (21 Jul 2018 20:40)      HPI:  52yo woman with history of obesity, htn, gerd, hld, hpylori on treatment p/w  sudden onset of dizziness , blurry  vision and difficulty speaking around 6pm and came to [facility] within 1 hr.Patient was watching tv with  when she suddenly felt  dizzy and according to  had difficulty speaking and her speech was slurred. She mentions her dizziness is better but still feel funny sensation in arm.In ED she was slightly better able to speak more but did not resolve completely and improved to NIHSS 3. She was given tpa and is being admitted to icu (21 Jul 2018 20:40)      PAST MEDICAL & SURGICAL HISTORY:  Prediabetes  GERD (gastroesophageal reflux disease)  Helicobacter pylori (H. pylori) infection  High cholesterol  HTN (hypertension)  S/P appendectomy      FAMILY HISTORY:  Family history of stroke (Mother, Sibling), sister has migraines       Social History: (-) x 3    Allergies    No Known Allergies    Intolerances        MEDICATIONS  (STANDING):  amoxicillin      Capsule 1000 milliGRAM(s) Oral two times a day  aspirin  chewable 162 milliGRAM(s) Oral daily  atorvastatin 80 milliGRAM(s) Oral at bedtime  clarithromycin 500 milliGRAM(s) Oral every 12 hours  enoxaparin Injectable 40 milliGRAM(s) SubCutaneous at bedtime  lisinopril 10 milliGRAM(s) Oral daily  pantoprazole    Tablet 40 milliGRAM(s) Oral before breakfast    MEDICATIONS  (PRN):  acetaminophen   Tablet. 650 milliGRAM(s) Oral every 6 hours PRN Moderate Pain (4 - 6)      Review of systems:    Constitutional: No fever, weight loss or fatigue    Eyes: No eye pain or discharge  ENMT:  No difficulty hearing; No sinus or throat pain  Neck: No pain or stiffness  Respiratory: No cough, wheezing, chills or hemoptysis  Cardiovascular: No chest pain, palpitations, shortness of breath, dyspnea on exertion  Gastrointestinal: No abdominal pain, nausea, vomiting or hematemesis; No diarrhea or constipation.   Genitourinary: No dysuria, frequency, hematuria or incontinence  Neurological: History of headache, the last 1 day prior to presentation. Bilateral starts at upper neck and reaches to forhead.  Skin: No rashes or lesions   Endocrine: No heat or cold intolerance; No hair loss  Musculoskeletal: No joint pain or swelling  Psychiatric: No depression, anxiety, mood swings  Heme/Lymph: No easy bruising or bleeding gums    Vital Signs Last 24 Hrs  T(C): 36.1 (25 Jul 2018 07:34), Max: 36.4 (24 Jul 2018 15:43)  T(F): 97 (25 Jul 2018 07:34), Max: 97.5 (24 Jul 2018 15:43)  HR: 60 (25 Jul 2018 07:34) (60 - 89)  BP: 121/69 (25 Jul 2018 07:34) (106/55 - 155/85)  BP(mean): --  RR: 18 (25 Jul 2018 07:34) (18 - 18)  SpO2: --    Neurologic Examination:  NH score: 2  General:  Appearance is consistent with chronologic age.  No abnormal facies.   General: The patient is oriented to person, place, time and date.  Recent and remote memory intact.  Fund of knowledge is intact and normal.  Language with normal repetition, comprehension and naming.   Cranial nerves:  Mild dysarthria, difficult pronouncing words. Diminished sensation on left side of face,  intact VA, VFF.  EOMI w/o nystagmus, skew or reported double vision.  PERRL.  No ptosis/weakness of eyelid closure.  Hearing grossly intact b/l.  Palate elevates midline.  Tongue midline.  Motor examination:   Normal tone, bulk and range of motion.  No tenderness, twitching, tremors or involuntary movements.  Formal Muscle Strength Testing: (MRC grade R/L) 5/5 UE; 5/5 LE.  No observable drift.  Reflexes:   2+ b/l, biceps, triceps, brachioradialis, patella and Achilles.  Plantar response downgoing b/l. Germain, clonus absent.  Sensory examination:   Intact to light touch and pinprick, pain, temperature and proprioception and vibration in all extremities.  Cerebellum:   FTN/HKS intact with normal DALY in all limbs.  No dysmetria or dysdiadokinesia.    Labs:   CBC Full  -  ( 25 Jul 2018 06:18 )  WBC Count : 5.80 K/uL  Hemoglobin : 11.5 g/dL  Hematocrit : 35.2 %  Platelet Count - Automated : 340 K/uL  Mean Cell Volume : 78.9 fL  Mean Cell Hemoglobin : 25.8 pg  Mean Cell Hemoglobin Concentration : 32.7 g/dL  Auto Neutrophil # : 2.35 K/uL  Auto Lymphocyte # : 2.82 K/uL  Auto Monocyte # : 0.43 K/uL  Auto Eosinophil # : 0.15 K/uL  Auto Basophil # : 0.04 K/uL  Auto Neutrophil % : 40.5 %  Auto Lymphocyte % : 48.6 %  Auto Monocyte % : 7.4 %  Auto Eosinophil % : 2.6 %  Auto Basophil % : 0.7 %    07-25    140  |  99  |  11  ----------------------------<  104<H>  4.2   |  26  |  0.9    Ca    8.8      25 Jul 2018 06:18  Mg     1.9     07-25    TPro  6.7  /  Alb  3.8  /  TBili  0.4  /  DBili  x   /  AST  17  /  ALT  18  /  AlkPhos  78  07-25    LIVER FUNCTIONS - ( 25 Jul 2018 06:18 )  Alb: 3.8 g/dL / Pro: 6.7 g/dL / ALK PHOS: 78 U/L / ALT: 18 U/L / AST: 17 U/L / GGT: x                   Neuroimaging:  NCHCT:   CT Angiography/Perfusion:  MRI Brain NC:  MRA Head/Neck:  EEG:    Assessment:  This is a 53y Female presenting due to an episode of slurry speech, NH score currently=2. She reports a headache 1 day prior to presentation, as well as family history of migraines. Need MRI to rule out CVA vs. neurological deficit post migraine.    Plan:   - Do MRI brain without contrast to rule out CVA  - Consider CTA of brain and neck if MRI positive.   07-25-18 @ 11:18 Neurology Consult    Patient is a 53y old  Female who presents with a chief complaint of lt arm numbness (21 Jul 2018 20:40)      HPI:  52yo woman with history of obesity, htn, gerd, hld, hpylori on treatment p/w  sudden onset of dizziness , blurry  vision and difficulty speaking around 6pm and came to [facility] within 1 hr.Patient was watching tv with  when she suddenly felt  dizzy and according to  had difficulty speaking and her speech was slurred. She mentions her dizziness is better but still feel funny sensation in arm.In ED she was slightly better able to speak more but did not resolve completely and improved to NIHSS 3. She was given tpa and is being admitted to icu (21 Jul 2018 20:40)      PAST MEDICAL & SURGICAL HISTORY:  Prediabetes  GERD (gastroesophageal reflux disease)  Helicobacter pylori (H. pylori) infection  High cholesterol  HTN (hypertension)  S/P appendectomy      FAMILY HISTORY:  Family history of stroke (Mother, Sibling), sister has migraines       Social History: (-) x 3    Allergies    No Known Allergies    Intolerances        MEDICATIONS  (STANDING):  amoxicillin      Capsule 1000 milliGRAM(s) Oral two times a day  aspirin  chewable 162 milliGRAM(s) Oral daily  atorvastatin 80 milliGRAM(s) Oral at bedtime  clarithromycin 500 milliGRAM(s) Oral every 12 hours  enoxaparin Injectable 40 milliGRAM(s) SubCutaneous at bedtime  lisinopril 10 milliGRAM(s) Oral daily  pantoprazole    Tablet 40 milliGRAM(s) Oral before breakfast    MEDICATIONS  (PRN):  acetaminophen   Tablet. 650 milliGRAM(s) Oral every 6 hours PRN Moderate Pain (4 - 6)      Review of systems:    Constitutional: No fever, weight loss or fatigue    Eyes: No eye pain or discharge  ENMT:  No difficulty hearing; No sinus or throat pain  Neck: No pain or stiffness  Respiratory: No cough, wheezing, chills or hemoptysis  Cardiovascular: No chest pain, palpitations, shortness of breath, dyspnea on exertion  Gastrointestinal: No abdominal pain, nausea, vomiting or hematemesis; No diarrhea or constipation.   Genitourinary: No dysuria, frequency, hematuria or incontinence  Neurological: History of headache, the last 1 day prior to presentation. Bilateral starts at upper neck and reaches to forhead.  Skin: No rashes or lesions   Endocrine: No heat or cold intolerance; No hair loss  Musculoskeletal: No joint pain or swelling  Psychiatric: No depression, anxiety, mood swings  Heme/Lymph: No easy bruising or bleeding gums    Vital Signs Last 24 Hrs  T(C): 36.1 (25 Jul 2018 07:34), Max: 36.4 (24 Jul 2018 15:43)  T(F): 97 (25 Jul 2018 07:34), Max: 97.5 (24 Jul 2018 15:43)  HR: 60 (25 Jul 2018 07:34) (60 - 89)  BP: 121/69 (25 Jul 2018 07:34) (106/55 - 155/85)  BP(mean): --  RR: 18 (25 Jul 2018 07:34) (18 - 18)  SpO2: --    Neurologic Examination:  NH score: 2  General:  Appearance is consistent with chronologic age.  No abnormal facies.   General: The patient is oriented to person, place, time and date.  Recent and remote memory intact.  Fund of knowledge is intact and normal.  Language with normal repetition, comprehension and naming.   Cranial nerves:  Mild dysarthria, difficult pronouncing words. Diminished sensation on left side of face,  intact VA, VFF.  EOMI w/o nystagmus, skew or reported double vision.  PERRL.  No ptosis/weakness of eyelid closure.  Hearing grossly intact b/l.  Palate elevates midline.  Tongue midline.  Motor examination:   Normal tone, bulk and range of motion.  No tenderness, twitching, tremors or involuntary movements.  Formal Muscle Strength Testing: (MRC grade R/L) 5/5 UE; 5/5 LE.  No observable drift.  Reflexes:   2+ b/l, biceps, triceps, brachioradialis, patella and Achilles.  Plantar response downgoing b/l. Germain, clonus absent.  Sensory examination:   Intact to light touch and pinprick, pain, temperature and proprioception and vibration in all extremities.  Cerebellum:   FTN/HKS intact with normal DALY in all limbs.  No dysmetria or dysdiadokinesia.    Labs:   CBC Full  -  ( 25 Jul 2018 06:18 )  WBC Count : 5.80 K/uL  Hemoglobin : 11.5 g/dL  Hematocrit : 35.2 %  Platelet Count - Automated : 340 K/uL  Mean Cell Volume : 78.9 fL  Mean Cell Hemoglobin : 25.8 pg  Mean Cell Hemoglobin Concentration : 32.7 g/dL  Auto Neutrophil # : 2.35 K/uL  Auto Lymphocyte # : 2.82 K/uL  Auto Monocyte # : 0.43 K/uL  Auto Eosinophil # : 0.15 K/uL  Auto Basophil # : 0.04 K/uL  Auto Neutrophil % : 40.5 %  Auto Lymphocyte % : 48.6 %  Auto Monocyte % : 7.4 %  Auto Eosinophil % : 2.6 %  Auto Basophil % : 0.7 %    07-25    140  |  99  |  11  ----------------------------<  104<H>  4.2   |  26  |  0.9    Ca    8.8      25 Jul 2018 06:18  Mg     1.9     07-25    TPro  6.7  /  Alb  3.8  /  TBili  0.4  /  DBili  x   /  AST  17  /  ALT  18  /  AlkPhos  78  07-25    LIVER FUNCTIONS - ( 25 Jul 2018 06:18 )  Alb: 3.8 g/dL / Pro: 6.7 g/dL / ALK PHOS: 78 U/L / ALT: 18 U/L / AST: 17 U/L / GGT: x                 Assessment:  This is a 53y Female presenting due to an episode of slurry speech, NH score currently=2. She reports a headache 1 day prior to presentation, as well as family history of migraines. Need MRI to rule out CVA vs. neurological deficit post migraine.    Plan:   - Do MRI brain without contrast to rule out CVA  - Consider CTA of brain and neck if MRI positive.   07-25-18 @ 11:18

## 2018-07-25 NOTE — OCCUPATIONAL THERAPY INITIAL EVALUATION ADULT - GENERAL OBSERVATIONS, REHAB EVAL
Pt seen 750-820. Pt c/o right sided headache, lue coolness and decreased coordination in lue and lle. ROSALBA Almodovar and Resident Marybel made aware. Pt left with resident at bedside.

## 2018-07-25 NOTE — PROGRESS NOTE ADULT - SUBJECTIVE AND OBJECTIVE BOX
SUBJECTIVE:    Patient is a 53y old Female who presents with a chief complaint of lt arm numbness (21 Jul 2018 20:40)    Currently admitted to medicine with the primary diagnosis of CVA (cerebral vascular accident)     Today is hospital day 4d. This morning she is resting comfortably in bed and reports no new issues or overnight events.     PAST MEDICAL & SURGICAL HISTORY  Prediabetes  GERD (gastroesophageal reflux disease)  Helicobacter pylori (H. pylori) infection  High cholesterol  HTN (hypertension)  S/P appendectomy    SOCIAL HISTORY:  Negative for smoking/alcohol/drug use.     ALLERGIES:  No Known Allergies    MEDICATIONS:  STANDING MEDICATIONS  amoxicillin      Capsule 1000 milliGRAM(s) Oral two times a day  aspirin  chewable 162 milliGRAM(s) Oral daily  atorvastatin 80 milliGRAM(s) Oral at bedtime  clarithromycin 500 milliGRAM(s) Oral every 12 hours  enoxaparin Injectable 40 milliGRAM(s) SubCutaneous at bedtime  lisinopril 10 milliGRAM(s) Oral daily  pantoprazole    Tablet 40 milliGRAM(s) Oral before breakfast    PRN MEDICATIONS  acetaminophen   Tablet. 650 milliGRAM(s) Oral every 6 hours PRN    VITALS:   T(F): 97  HR: 60  BP: 121/69  RR: 18  SpO2: --    LABS:                        11.5   5.80  )-----------( 340      ( 25 Jul 2018 06:18 )             35.2     07-25    140  |  99  |  11  ----------------------------<  104<H>  4.2   |  26  |  0.9    Ca    8.8      25 Jul 2018 06:18  Mg     1.9     07-25    TPro  6.7  /  Alb  3.8  /  TBili  0.4  /  DBili  x   /  AST  17  /  ALT  18  /  AlkPhos  78  07-25          Troponin T, Serum: <0.01 ng/mL (07-25-18 @ 02:56)  Creatine Kinase, Serum: 101 U/L (07-25-18 @ 02:56)      CARDIAC MARKERS ( 25 Jul 2018 02:56 )  x     / <0.01 ng/mL / 101 U/L / x     / <1.0 ng/mL      RADIOLOGY:    CT Head No Cont (07.22.18 @ 14:28) >  No CT evidence of acute intracranial hemorrhage. Stable exam since   7/21/2018.    Transthoracic Echocardiogram (07.22.18 @ 13:00) >  1. LV Ejection Fraction by Hartmann's Method with a biplane EF of 70 %.   2. Normal left ventricular size and wall thicknesses, with normal   systolic function.   3. Mild tricuspid regurgitation.    VA Duplex Carotid, Bilat (07.23.18 @ 19:51) >  20-39% stenosis of the right internal carotid artery.  20-39% stenosis of the left internal carotid artery.    PHYSICAL EXAM:    GEN: No acute distress, difficulty swallowing at normal pace  LUNGS: distant breath sounds bilaterally   HEART: S1/S2 present. RRR.   ABD: Soft, non-tender, non-distended. Obese, weak bowel sounds  EXT: No edema, cyanosis or clubbing  NEURO: AAOX3. 4/5 muscle strength on the left side, pins and needles on the left orofacial fold

## 2018-07-26 LAB
ALBUMIN SERPL ELPH-MCNC: 3.9 G/DL — SIGNIFICANT CHANGE UP (ref 3.5–5.2)
ALP SERPL-CCNC: 87 U/L — SIGNIFICANT CHANGE UP (ref 30–115)
ALT FLD-CCNC: 18 U/L — SIGNIFICANT CHANGE UP (ref 0–41)
ANION GAP SERPL CALC-SCNC: 13 MMOL/L — SIGNIFICANT CHANGE UP (ref 7–14)
AST SERPL-CCNC: 16 U/L — SIGNIFICANT CHANGE UP (ref 0–41)
BASOPHILS # BLD AUTO: 0.03 K/UL — SIGNIFICANT CHANGE UP (ref 0–0.2)
BASOPHILS NFR BLD AUTO: 0.6 % — SIGNIFICANT CHANGE UP (ref 0–1)
BILIRUB SERPL-MCNC: 0.5 MG/DL — SIGNIFICANT CHANGE UP (ref 0.2–1.2)
BUN SERPL-MCNC: 10 MG/DL — SIGNIFICANT CHANGE UP (ref 10–20)
CALCIUM SERPL-MCNC: 9.2 MG/DL — SIGNIFICANT CHANGE UP (ref 8.5–10.1)
CHLORIDE SERPL-SCNC: 99 MMOL/L — SIGNIFICANT CHANGE UP (ref 98–110)
CHOLEST SERPL-MCNC: 184 MG/DL — SIGNIFICANT CHANGE UP (ref 100–200)
CO2 SERPL-SCNC: 28 MMOL/L — SIGNIFICANT CHANGE UP (ref 17–32)
CREAT SERPL-MCNC: 1 MG/DL — SIGNIFICANT CHANGE UP (ref 0.7–1.5)
EOSINOPHIL # BLD AUTO: 0.19 K/UL — SIGNIFICANT CHANGE UP (ref 0–0.7)
EOSINOPHIL NFR BLD AUTO: 3.7 % — SIGNIFICANT CHANGE UP (ref 0–8)
GLUCOSE SERPL-MCNC: 102 MG/DL — HIGH (ref 70–99)
HCT VFR BLD CALC: 38.1 % — SIGNIFICANT CHANGE UP (ref 37–47)
HDLC SERPL-MCNC: 47 MG/DL — SIGNIFICANT CHANGE UP (ref 40–125)
HGB BLD-MCNC: 12.1 G/DL — SIGNIFICANT CHANGE UP (ref 12–16)
IMM GRANULOCYTES NFR BLD AUTO: 0.2 % — SIGNIFICANT CHANGE UP (ref 0.1–0.3)
INR BLD: 1.11 RATIO — SIGNIFICANT CHANGE UP (ref 0.65–1.3)
LIPID PNL WITH DIRECT LDL SERPL: 118 MG/DL — SIGNIFICANT CHANGE UP (ref 4–129)
LYMPHOCYTES # BLD AUTO: 2.68 K/UL — SIGNIFICANT CHANGE UP (ref 1.2–3.4)
LYMPHOCYTES # BLD AUTO: 52.2 % — HIGH (ref 20.5–51.1)
MAGNESIUM SERPL-MCNC: 2 MG/DL — SIGNIFICANT CHANGE UP (ref 1.8–2.4)
MCHC RBC-ENTMCNC: 25.6 PG — LOW (ref 27–31)
MCHC RBC-ENTMCNC: 31.8 G/DL — LOW (ref 32–37)
MCV RBC AUTO: 80.5 FL — LOW (ref 81–99)
MONOCYTES # BLD AUTO: 0.41 K/UL — SIGNIFICANT CHANGE UP (ref 0.1–0.6)
MONOCYTES NFR BLD AUTO: 8 % — SIGNIFICANT CHANGE UP (ref 1.7–9.3)
NEUTROPHILS # BLD AUTO: 1.81 K/UL — SIGNIFICANT CHANGE UP (ref 1.4–6.5)
NEUTROPHILS NFR BLD AUTO: 35.3 % — LOW (ref 42.2–75.2)
PLATELET # BLD AUTO: 364 K/UL — SIGNIFICANT CHANGE UP (ref 130–400)
POTASSIUM SERPL-MCNC: 4.3 MMOL/L — SIGNIFICANT CHANGE UP (ref 3.5–5)
POTASSIUM SERPL-SCNC: 4.3 MMOL/L — SIGNIFICANT CHANGE UP (ref 3.5–5)
PROT SERPL-MCNC: 6.8 G/DL — SIGNIFICANT CHANGE UP (ref 6–8)
PROTHROM AB SERPL-ACNC: 12 SEC — SIGNIFICANT CHANGE UP (ref 9.95–12.87)
RBC # BLD: 4.73 M/UL — SIGNIFICANT CHANGE UP (ref 4.2–5.4)
RBC # FLD: 16.1 % — HIGH (ref 11.5–14.5)
SODIUM SERPL-SCNC: 140 MMOL/L — SIGNIFICANT CHANGE UP (ref 135–146)
TOTAL CHOLESTEROL/HDL RATIO MEASUREMENT: 3.9 RATIO — LOW (ref 4–5.5)
TRIGL SERPL-MCNC: 140 MG/DL — SIGNIFICANT CHANGE UP (ref 10–149)
WBC # BLD: 5.13 K/UL — SIGNIFICANT CHANGE UP (ref 4.8–10.8)
WBC # FLD AUTO: 5.13 K/UL — SIGNIFICANT CHANGE UP (ref 4.8–10.8)

## 2018-07-26 RX ADMIN — Medication 162 MILLIGRAM(S): at 11:20

## 2018-07-26 RX ADMIN — Medication 1000 MILLIGRAM(S): at 05:35

## 2018-07-26 RX ADMIN — Medication 500 MILLIGRAM(S): at 05:40

## 2018-07-26 RX ADMIN — ATORVASTATIN CALCIUM 80 MILLIGRAM(S): 80 TABLET, FILM COATED ORAL at 22:20

## 2018-07-26 RX ADMIN — LISINOPRIL 10 MILLIGRAM(S): 2.5 TABLET ORAL at 05:35

## 2018-07-26 RX ADMIN — ENOXAPARIN SODIUM 40 MILLIGRAM(S): 100 INJECTION SUBCUTANEOUS at 22:21

## 2018-07-26 RX ADMIN — ENOXAPARIN SODIUM 40 MILLIGRAM(S): 100 INJECTION SUBCUTANEOUS at 22:22

## 2018-07-26 RX ADMIN — PANTOPRAZOLE SODIUM 40 MILLIGRAM(S): 20 TABLET, DELAYED RELEASE ORAL at 06:00

## 2018-07-26 NOTE — PROGRESS NOTE ADULT - ASSESSMENT
53 year old woman with history of obesity, borderline hypothyroidism, gerd and on treatment for H.Pylori, comes for sudden onset of dizziness and difficulty speaking.  Patient was watching tv with  when she suddenly felt dizzy and according to  had difficulty speaking and her speech was slurred. She came to the ED, her NIHSS was 3 and she was given systemic tPA on Saturday 7/21/18 at 8:00 pm. CT Head showed no hemorrhage or mass effect, which was stable on repeat head CT.     1. Acute C.V.A  - Resolved, with some difficulty in swallowing and 4/5 muscle strength on the left side  - S/p T.P.A  - on Aspirin 162 mg and lipitor 80mg  - Close monitoring of blood pressure and on neuro-checks. Last BP was 125/60!  - ECHO showed mld T.R and EF of 70 %, CT head showed no evidence of Acute I.C.A and Carotid duplex showed 20-39 % stenosis.  - Pending MRI without contrast, If negative for acute ischemia may be discharged home with outpatient neurologic follow up in 2-4 weeks  - PT following the patient  - Speech and swallow following, she is on Mechanical Soft Cut up with thin liquids diet, tolerating well    2. H. Pylori Infection  - treated, off antibiotics  - on protonix PO    3. Hypertension  - On lisinopril 10 mg q 24 hrs    4. Morbid Obesity with BMI of 40  - dietary counseling especially in light of medical history    On DVT prophylaxis with lovenox subcutaneously  On GI prophylaxis  Activity- ambulating with the help of PT on the floor  full code

## 2018-07-26 NOTE — PROGRESS NOTE ADULT - ATTENDING COMMENTS
Patient seen and examined independently. Case discussed with housestaff, nursing, social work, patient, neuro. I agree with the resident's note, physical exam, and plan except as below. LUE 5-/5. Decreased sesn Lt face, LUE. Patient slightly feels better. No new complaints. Tele no events. Awaiting MRI brain. Cont ASA, statin.
Pt interviewed and examined.    Neuroimaging to r/o stroke.  Medical management to lower stroke risk.
see edits above    dispo pending-good 4A candidate

## 2018-07-26 NOTE — PROGRESS NOTE ADULT - SUBJECTIVE AND OBJECTIVE BOX
SUBJECTIVE:    Patient is a 53y old Female who presents with a chief complaint of lt arm numbness (21 Jul 2018 20:40)    Currently admitted to medicine with the primary diagnosis of CVA (cerebral vascular accident)     Today is hospital day 5d. This morning she is resting comfortably in bed and reports no new issues or overnight events.     PAST MEDICAL & SURGICAL HISTORY  Prediabetes  GERD (gastroesophageal reflux disease)  Helicobacter pylori (H. pylori) infection  High cholesterol  HTN (hypertension)  S/P appendectomy    SOCIAL HISTORY:  Negative for smoking/alcohol/drug use.     ALLERGIES:  No Known Allergies    MEDICATIONS:  STANDING MEDICATIONS  aspirin  chewable 162 milliGRAM(s) Oral daily  atorvastatin 80 milliGRAM(s) Oral at bedtime  enoxaparin Injectable 40 milliGRAM(s) SubCutaneous at bedtime  lisinopril 10 milliGRAM(s) Oral daily  pantoprazole    Tablet 40 milliGRAM(s) Oral before breakfast    PRN MEDICATIONS  acetaminophen   Tablet. 650 milliGRAM(s) Oral every 6 hours PRN    VITALS:   T(F): 97.3  HR: 65  BP: 125/60  RR: 16  SpO2: 98%    LABS:                        12.1   5.13  )-----------( 364      ( 26 Jul 2018 06:10 )             38.1     07-26    140  |  99  |  10  ----------------------------<  102<H>  4.3   |  28  |  1.0    Ca    9.2      26 Jul 2018 06:10  Mg     2.0     07-26    TPro  6.8  /  Alb  3.9  /  TBili  0.5  /  DBili  x   /  AST  16  /  ALT  18  /  AlkPhos  87  07-26    PT/INR - ( 26 Jul 2018 06:10 )   PT: 12.00 sec;   INR: 1.11 ratio                   CARDIAC MARKERS ( 25 Jul 2018 02:56 )  x     / <0.01 ng/mL / 101 U/L / x     / <1.0 ng/mL      RADIOLOGY:    CT Head No Cont (07.22.18 @ 14:28) >  No CT evidence of acute intracranial hemorrhage. Stable exam since   7/21/2018.    Transthoracic Echocardiogram (07.22.18 @ 13:00) >  1. LV Ejection Fraction by Hartmann's Method with a biplane EF of 70 %.   2. Normal left ventricular size and wall thicknesses, with normal   systolic function.   3. Mild tricuspid regurgitation.    VA Duplex Carotid, Bilat (07.23.18 @ 19:51) >  20-39% stenosis of the right internal carotid artery.  20-39% stenosis of the left internal carotid artery.    PHYSICAL EXAM:    GEN: Mild distress, anxious  HEENT: difficulty swallowing at normal pace, pain around left eye, slight weakness with abduction of left eye  LUNGS: distant breath sounds bilaterally   HEART: S1/S2 present. RRR.   ABD: Soft, non-tender, non-distended. Obese, weak bowel sounds  EXT: No edema, cyanosis or clubbing  NEURO: AAOX3. 4/5 muscle strength on the left side, pins and needles on the left orofacial fold

## 2018-07-26 NOTE — PROGRESS NOTE ADULT - SUBJECTIVE AND OBJECTIVE BOX
Neurology Progress Note    Interval History:      Pt c/o 6/10 headache.  Still c/o numbness left face and arm.  MRI brain pending.    Vital Signs Last 24 Hrs  T(C): 36.3 (26 Jul 2018 07:22), Max: 36.4 (25 Jul 2018 15:49)  T(F): 97.3 (26 Jul 2018 07:22), Max: 97.6 (25 Jul 2018 15:49)  HR: 65 (26 Jul 2018 07:22) (62 - 70)  BP: 125/60 (26 Jul 2018 07:22) (114/60 - 128/60)  BP(mean): --  RR: 16 (26 Jul 2018 07:22) (16 - 18)  SpO2: 98% (26 Jul 2018 03:17) (98% - 98%)    Neurological Exam:   Mental status: Awake, alert and oriented x3.  Speech is clear but a little hesitent.  Cranial nerves: Pupils equally round and reactive to light, visual fields full, no nystagmus, extraocular muscles intact, mild decrease LT left face and symmetric, face symmetric, hearing intact to finger rub, palate elevation symmetric, tongue was midline.  Motor:  MRC grading 5/5- b/l UE/LE.    Sensation: Mild decrease LT left face and arm.  Coordination: No dysmetria on finger-to-nose and heel-to-shin.  No dysdiadokinesia.      Labs:  CBC Full  -  ( 26 Jul 2018 06:10 )  WBC Count : 5.13 K/uL  Hemoglobin : 12.1 g/dL  Hematocrit : 38.1 %  Platelet Count - Automated : 364 K/uL  Mean Cell Volume : 80.5 fL  Mean Cell Hemoglobin : 25.6 pg  Mean Cell Hemoglobin Concentration : 31.8 g/dL  Auto Neutrophil # : 1.81 K/uL  Auto Lymphocyte # : 2.68 K/uL  Auto Monocyte # : 0.41 K/uL  Auto Eosinophil # : 0.19 K/uL  Auto Basophil # : 0.03 K/uL  Auto Neutrophil % : 35.3 %  Auto Lymphocyte % : 52.2 %  Auto Monocyte % : 8.0 %  Auto Eosinophil % : 3.7 %  Auto Basophil % : 0.6 %    07-26    140  |  99  |  10  ----------------------------<  102<H>  4.3   |  28  |  1.0    Ca    9.2      26 Jul 2018 06:10  Mg     2.0     07-26    TPro  6.8  /  Alb  3.9  /  TBili  0.5  /  DBili  x   /  AST  16  /  ALT  18  /  AlkPhos  87  07-26    LIVER FUNCTIONS - ( 26 Jul 2018 06:10 )  Alb: 3.9 g/dL / Pro: 6.8 g/dL / ALK PHOS: 87 U/L / ALT: 18 U/L / AST: 16 U/L / GGT: x           PT/INR - ( 26 Jul 2018 06:10 )   PT: 12.00 sec;   INR: 1.11 ratio         Assessment:  This is a 53y Female w/ h/o headache and left sided weakness and numbness.  She is s/p TPA. NIH = 2.  She still has headache and is awaiting MRI.  Suspect migraine related symptoms however need to exclude an ischemic event prior to discharge given persistent symptoms albeit mild.      Plan:  1.  MRI brain w/o.  If negative for acute ischemia may be discharged home with outpatient neurologic f/u n 2-4 weeks.  2.  Antiplatelet therapy and statin 80 mg/day.  3.  PT/OT/Speech.

## 2018-07-27 ENCOUNTER — TRANSCRIPTION ENCOUNTER (OUTPATIENT)
Age: 53
End: 2018-07-27

## 2018-07-27 VITALS — HEIGHT: 69 IN | WEIGHT: 270.95 LBS

## 2018-07-27 LAB
ALBUMIN SERPL ELPH-MCNC: 3.6 G/DL — SIGNIFICANT CHANGE UP (ref 3.5–5.2)
ALP SERPL-CCNC: 88 U/L — SIGNIFICANT CHANGE UP (ref 30–115)
ALT FLD-CCNC: 20 U/L — SIGNIFICANT CHANGE UP (ref 0–41)
ANION GAP SERPL CALC-SCNC: 13 MMOL/L — SIGNIFICANT CHANGE UP (ref 7–14)
AST SERPL-CCNC: 16 U/L — SIGNIFICANT CHANGE UP (ref 0–41)
BASOPHILS # BLD AUTO: 0.04 K/UL — SIGNIFICANT CHANGE UP (ref 0–0.2)
BASOPHILS NFR BLD AUTO: 0.7 % — SIGNIFICANT CHANGE UP (ref 0–1)
BILIRUB SERPL-MCNC: 0.4 MG/DL — SIGNIFICANT CHANGE UP (ref 0.2–1.2)
BUN SERPL-MCNC: 15 MG/DL — SIGNIFICANT CHANGE UP (ref 10–20)
CALCIUM SERPL-MCNC: 9 MG/DL — SIGNIFICANT CHANGE UP (ref 8.5–10.1)
CHLORIDE SERPL-SCNC: 96 MMOL/L — LOW (ref 98–110)
CO2 SERPL-SCNC: 27 MMOL/L — SIGNIFICANT CHANGE UP (ref 17–32)
CREAT SERPL-MCNC: 1.1 MG/DL — SIGNIFICANT CHANGE UP (ref 0.7–1.5)
EOSINOPHIL # BLD AUTO: 0.21 K/UL — SIGNIFICANT CHANGE UP (ref 0–0.7)
EOSINOPHIL NFR BLD AUTO: 3.7 % — SIGNIFICANT CHANGE UP (ref 0–8)
GLUCOSE SERPL-MCNC: 109 MG/DL — HIGH (ref 70–99)
HCT VFR BLD CALC: 38 % — SIGNIFICANT CHANGE UP (ref 37–47)
HGB BLD-MCNC: 12.1 G/DL — SIGNIFICANT CHANGE UP (ref 12–16)
IMM GRANULOCYTES NFR BLD AUTO: 0.2 % — SIGNIFICANT CHANGE UP (ref 0.1–0.3)
INR BLD: 1.08 RATIO — SIGNIFICANT CHANGE UP (ref 0.65–1.3)
LYMPHOCYTES # BLD AUTO: 2.47 K/UL — SIGNIFICANT CHANGE UP (ref 1.2–3.4)
LYMPHOCYTES # BLD AUTO: 43.4 % — SIGNIFICANT CHANGE UP (ref 20.5–51.1)
MAGNESIUM SERPL-MCNC: 1.9 MG/DL — SIGNIFICANT CHANGE UP (ref 1.8–2.4)
MCHC RBC-ENTMCNC: 25.7 PG — LOW (ref 27–31)
MCHC RBC-ENTMCNC: 31.8 G/DL — LOW (ref 32–37)
MCV RBC AUTO: 80.7 FL — LOW (ref 81–99)
MONOCYTES # BLD AUTO: 0.46 K/UL — SIGNIFICANT CHANGE UP (ref 0.1–0.6)
MONOCYTES NFR BLD AUTO: 8.1 % — SIGNIFICANT CHANGE UP (ref 1.7–9.3)
NEUTROPHILS # BLD AUTO: 2.5 K/UL — SIGNIFICANT CHANGE UP (ref 1.4–6.5)
NEUTROPHILS NFR BLD AUTO: 43.9 % — SIGNIFICANT CHANGE UP (ref 42.2–75.2)
PLATELET # BLD AUTO: 338 K/UL — SIGNIFICANT CHANGE UP (ref 130–400)
POTASSIUM SERPL-MCNC: 4.1 MMOL/L — SIGNIFICANT CHANGE UP (ref 3.5–5)
POTASSIUM SERPL-SCNC: 4.1 MMOL/L — SIGNIFICANT CHANGE UP (ref 3.5–5)
PROT SERPL-MCNC: 7 G/DL — SIGNIFICANT CHANGE UP (ref 6–8)
PROTHROM AB SERPL-ACNC: 11.7 SEC — SIGNIFICANT CHANGE UP (ref 9.95–12.87)
RBC # BLD: 4.71 M/UL — SIGNIFICANT CHANGE UP (ref 4.2–5.4)
RBC # FLD: 15.7 % — HIGH (ref 11.5–14.5)
SODIUM SERPL-SCNC: 136 MMOL/L — SIGNIFICANT CHANGE UP (ref 135–146)
WBC # BLD: 5.69 K/UL — SIGNIFICANT CHANGE UP (ref 4.8–10.8)
WBC # FLD AUTO: 5.69 K/UL — SIGNIFICANT CHANGE UP (ref 4.8–10.8)

## 2018-07-27 RX ORDER — ASPIRIN/CALCIUM CARB/MAGNESIUM 324 MG
1 TABLET ORAL
Qty: 30 | Refills: 2 | OUTPATIENT
Start: 2018-07-27 | End: 2018-10-24

## 2018-07-27 RX ORDER — AMOXICILLIN 250 MG/5ML
2 SUSPENSION, RECONSTITUTED, ORAL (ML) ORAL
Qty: 0 | Refills: 0 | COMMUNITY

## 2018-07-27 RX ORDER — CLARITHROMYCIN 500 MG
1 TABLET ORAL
Qty: 0 | Refills: 0 | COMMUNITY

## 2018-07-27 RX ADMIN — PANTOPRAZOLE SODIUM 40 MILLIGRAM(S): 20 TABLET, DELAYED RELEASE ORAL at 06:39

## 2018-07-27 RX ADMIN — Medication 162 MILLIGRAM(S): at 12:04

## 2018-07-27 RX ADMIN — LISINOPRIL 10 MILLIGRAM(S): 2.5 TABLET ORAL at 06:39

## 2018-07-27 NOTE — DISCHARGE NOTE ADULT - CARE PROVIDER_API CALL
Karri Wrigth), Internal Medicine  [address]  [address]  [place] [address]  Phone: [phone]  Fax: [phone]    Alan Oliveira), EEGEpilepsy; Neurology  [address]  [address]  [place] [address]  Phone: [phone]  Fax: [phone] Karri Wright), Internal Medicine  [address]  [address]  [place] [address]  Phone: [phone]  Fax: [phone]    Jono Johnson), Neurology  [address] [facility] [address]  [address]  [place] [address]  Phone: [phone]  Fax: [phone]    Physical Therapy,   Phone: (   )    -  Fax: (   )    -

## 2018-07-27 NOTE — DIETITIAN INITIAL EVALUATION ADULT. - OTHER INFO
RD assessment as LOS/assessment referral. Pt w/ CVA s/p TPA on [facility] soft ground with thins (although cleared for cut up with thins per SLP recs on 7/25). Pt reports good ap/PO intake PTA (on regular with thins at home), and good PO currently. pt eager to go home and unwilling to provide detailed diet info at this time. Per EMR, pt has been consuming % of meals. Denies any recent wt changes or food allergies. + BM 7/25. Skin intact

## 2018-07-27 NOTE — SWALLOW BEDSIDE ASSESSMENT ADULT - SLP PERTINENT HISTORY OF CURRENT PROBLEM
Pt admitted with slurred speech, L arm weakness. administered TPA in ED, CTH (-)
Pt admitted with slurred speech, L arm weakness. administered TPA in ED, CTH (-)
CVA s/p TPA, pt reported tightness of left side of face
CVA, prediabetes, HTN

## 2018-07-27 NOTE — DISCHARGE NOTE ADULT - PLAN OF CARE
resolution S/P T.P.A. No symptoms currently. No findings seen on the radiographic studies. Patient clinically stable for discharge. Will follow up with neurology for headaches Patient completed the course, stable without any gastric complaints currently Suspect due to cervical radiculopathy vs complicated migraine. Cont Aspirin and Lipitor. Follow up with neurologist in the office. No findings seen on the radiographic studies. Patient clinically stable for discharge. Will follow up with neurology for headaches

## 2018-07-27 NOTE — SWALLOW BEDSIDE ASSESSMENT ADULT - NS ASR SWALLOW FINDINGS DISCUS
Nursing/Patient/Physician
Nursing
ROSALBA No/Physician/Nursing
RN Marshall wu/Nursing/Physician/Patient

## 2018-07-27 NOTE — DISCHARGE NOTE ADULT - CARE PLAN
Principal Discharge DX:	Cerebrovascular accident (CVA), unspecified mechanism  Goal:	resolution  Assessment and plan of treatment:	S/P T.P.A. No symptoms currently. No findings seen on the radiographic studies. Patient clinically stable for discharge. Will follow up with neurology for headaches  Secondary Diagnosis:	Helicobacter pylori (H. pylori) infection  Goal:	resolution  Assessment and plan of treatment:	Patient completed the course, stable without any gastric complaints currently Principal Discharge DX:	Weakness  Goal:	resolution  Assessment and plan of treatment:	Suspect due to cervical radiculopathy vs complicated migraine. Cont Aspirin and Lipitor. Follow up with neurologist in the office. No findings seen on the radiographic studies. Patient clinically stable for discharge. Will follow up with neurology for headaches  Secondary Diagnosis:	Helicobacter pylori (H. pylori) infection  Goal:	resolution  Assessment and plan of treatment:	Patient completed the course, stable without any gastric complaints currently

## 2018-07-27 NOTE — DISCHARGE NOTE ADULT - PATIENT PORTAL LINK FT
You can access the [facility] Patient Portal, offered by [facility], by registering with the following website: [email or web address]

## 2018-07-27 NOTE — DISCHARGE NOTE ADULT - MEDICATION SUMMARY - MEDICATIONS TO TAKE
I will START or STAY ON the medications listed below when I get home from the hospital:    aspirin 81 mg oral tablet, chewable  -- 1 tab(s) by mouth once a day   -- Indication: For CVA    lisinopril 10 mg oral tablet  -- 1 tab(s) by mouth once a day  -- Indication: For HTN (hypertension)    Lipitor 40 mg oral tablet  -- 1 tab(s) by mouth once a day  -- Indication: For CVA    omeprazole 40 mg oral delayed release capsule  -- 1 cap(s) by mouth once a day  -- Indication: For GERD (gastroesophageal reflux disease)    Vitamin D3 1000 intl units oral tablet  -- 1 tab(s) by mouth once a day  -- Indication: For vitamin d deficiency

## 2018-07-27 NOTE — DIETITIAN INITIAL EVALUATION ADULT. - ENERGY NEEDS
Calories: 1895 kcals/day (MSJ x 1) for BMI of 40  Protein: 66-79 g/day (1-1.2 g/kg IBW)  Fluids: 1ml/kcal

## 2018-07-27 NOTE — SWALLOW BEDSIDE ASSESSMENT ADULT - SWALLOW EVAL: DIAGNOSIS
No s/s aspiration/penetration for thin liquids and mechanical soft
No s/s aspiration/penetration for thin liquids, puree, mechanical soft. Mild-moderate oral dysphagia for mechanical soft
+toleration of thin, puree and soft w/ mild oral dysphagia and w/o any overt s/s of penetration/aspiration
moderate oral dysphagia; suspected pharyngeal dysphagia

## 2018-07-27 NOTE — PROGRESS NOTE ADULT - SUBJECTIVE AND OBJECTIVE BOX
Pt seen and examined independently. No new complaints. Feeling better. Ambulating on her own. MRI negative. Still some mild HA and neck tenderness. Suspected cervicalgia vs complicated migraine as etiology of pt's presentation. D/c planning if ok with neuro. Outpt Neuro, PMD, PT.    Discharge instructions discussed and patient knows when to seek immediate medical attention.  Patient has proper follow up.  All results discussed and patient aware they may require further work up.  Stressed importance of proper follow up.  Medications prescribed discussed.  All questions and concerns from patient and family addressed. Understanding of instructions verbalized.    Time spent in completing discharge process and coordinating care 45 minutes.

## 2018-07-27 NOTE — DISCHARGE NOTE ADULT - PROVIDER TOKENS
TOKEN:'[id number]',TOKEN:'[id number]' TOKEN:'[id number]:'[id number]',FREE:[LAST:[Physical Therapy],PHONE:[(   )    -],FAX:[(   )    -]]

## 2018-07-27 NOTE — SWALLOW BEDSIDE ASSESSMENT ADULT - SWALLOW EVAL: RECOMMENDED DIET
Mechanical soft cut-up w/ thin
Mechanical soft cut up with thin liquids
Mechanical soft cut up with thin liquids
CHANDRAKANT marteL

## 2018-07-27 NOTE — DISCHARGE NOTE ADULT - CARE PROVIDERS DIRECT ADDRESSES
,[email or web address],DirectAddress_Unknown

## 2018-07-27 NOTE — DISCHARGE NOTE ADULT - MEDICATION SUMMARY - MEDICATIONS TO STOP TAKING
I will STOP taking the medications listed below when I get home from the hospital:    amoxicillin 500 mg oral capsule  -- 2 cap(s) by mouth 2 times a day    clarithromycin 500 mg oral tablet  -- 1 tab(s) by mouth every 12 hours

## 2018-07-27 NOTE — DISCHARGE NOTE ADULT - HOSPITAL COURSE
53 year old woman with history of obesity, borderline hypothyroidism, gerd and on treatment for H.Pylori, comes for sudden onset of dizziness and difficulty speaking.  Patient was watching tv with  when she suddenly felt dizzy and according to  had difficulty speaking and her speech was slurred. She came to the ED, her NIHSS was 3 and she was given systemic tPA on Saturday 7/21/18 at 8:00 pm. CT Head showed no hemorrhage or mass effect, which was stable on repeat head CT.     1. Acute C.V.A  - Resolved  - S/p T.P.A  - Discharged on Aspirin 81 mg and lipitor 40 mg  - ECHO showed mld T.R and EF of 70 %, CT head showed no evidence of Acute I.C.A and Carotid duplex showed 20-39 % stenosis.  - MRI without contrast negative for acute ischemia stable to be discharged home with outpatient neurologic follow up in 2-4 weeks    2. H. Pylori Infection  - treated, off antibiotics  - on protonix PO 53 year old woman with history of obesity, borderline hypothyroidism, gerd and on treatment for H.Pylori, comes for sudden onset of dizziness and difficulty speaking.  Patient was watching tv with  when she suddenly felt dizzy and according to  had difficulty speaking and her speech was slurred. She came to the ED, her NIHSS was 3 and she was given systemic tPA on Saturday 7/21/18 at 8:00 pm. CT Head showed no hemorrhage or mass effect, which was stable on repeat head CT.     1. Slurring/LUE weakness - likely due to cervical radiculopathy vs complicated migraine vs small CVA missed on MRI  - Resolved  - S/p T.P.A  - Discharged on Aspirin 81 mg and lipitor 40 mg  - ECHO showed mld T.R and EF of 70 %, CT head showed no evidence of Acute I.C.A and Carotid duplex showed 20-39 % stenosis.  - MRI without contrast negative for acute ischemia stable to be discharged home with outpatient neurologic follow up in 2-4 weeks  -cleared by neuro    2. H. Pylori Infection  - treated, off antibiotics  - on protonix PO

## 2018-07-27 NOTE — SWALLOW BEDSIDE ASSESSMENT ADULT - SLP GENERAL OBSERVATIONS
Pt received in bed awake and eating breakfast on room air, reports improvements in speech
Pt awake and alert with no c/o pain.
Pt awake and alert with no c/o pain. L facial weakness noted. Moderate dysarthria
Alert, responsive, cooperative, with oral motor weakness for labial, buccal, and lingual structures

## 2018-07-30 ENCOUNTER — RX RENEWAL (OUTPATIENT)
Age: 53
End: 2018-07-30

## 2018-07-30 RX ORDER — ERGOCALCIFEROL 1.25 MG/1
1.25 MG CAPSULE, LIQUID FILLED ORAL
Qty: 4 | Refills: 3 | Status: ACTIVE | COMMUNITY
Start: 2018-03-16 | End: 1900-01-01

## 2018-08-01 ENCOUNTER — APPOINTMENT (OUTPATIENT)
Dept: OBGYN | Facility: CLINIC | Age: 53
End: 2018-08-01

## 2018-08-01 ENCOUNTER — OUTPATIENT (OUTPATIENT)
Dept: OUTPATIENT SERVICES | Facility: HOSPITAL | Age: 53
LOS: 1 days | Discharge: HOME | End: 2018-08-01

## 2018-08-01 VITALS
BODY MASS INDEX: 46.26 KG/M2 | SYSTOLIC BLOOD PRESSURE: 130 MMHG | HEIGHT: 64 IN | DIASTOLIC BLOOD PRESSURE: 90 MMHG | WEIGHT: 271 LBS

## 2018-08-01 DIAGNOSIS — Z90.49 ACQUIRED ABSENCE OF OTHER SPECIFIED PARTS OF DIGESTIVE TRACT: Chronic | ICD-10-CM

## 2018-08-01 DIAGNOSIS — N95.0 POSTMENOPAUSAL BLEEDING: ICD-10-CM

## 2018-08-01 PROBLEM — K21.9 GASTRO-ESOPHAGEAL REFLUX DISEASE WITHOUT ESOPHAGITIS: Chronic | Status: ACTIVE | Noted: 2018-07-21

## 2018-08-01 PROBLEM — A04.8 OTHER SPECIFIED BACTERIAL INTESTINAL INFECTIONS: Chronic | Status: ACTIVE | Noted: 2018-07-21

## 2018-08-01 PROBLEM — I10 ESSENTIAL (PRIMARY) HYPERTENSION: Chronic | Status: ACTIVE | Noted: 2018-07-21

## 2018-08-01 PROBLEM — E78.00 PURE HYPERCHOLESTEROLEMIA, UNSPECIFIED: Chronic | Status: ACTIVE | Noted: 2018-07-21

## 2018-08-01 PROBLEM — R73.03 PREDIABETES: Chronic | Status: ACTIVE | Noted: 2018-07-21

## 2018-08-06 DIAGNOSIS — N95.0 POSTMENOPAUSAL BLEEDING: ICD-10-CM

## 2018-08-20 ENCOUNTER — APPOINTMENT (OUTPATIENT)
Dept: INTERNAL MEDICINE | Facility: CLINIC | Age: 53
End: 2018-08-20

## 2018-08-24 ENCOUNTER — APPOINTMENT (OUTPATIENT)
Dept: ENDOCRINOLOGY | Facility: CLINIC | Age: 53
End: 2018-08-24

## 2018-08-28 ENCOUNTER — APPOINTMENT (OUTPATIENT)
Dept: SURGERY | Facility: CLINIC | Age: 53
End: 2018-08-28
Payer: MEDICAID

## 2018-08-28 ENCOUNTER — OUTPATIENT (OUTPATIENT)
Dept: OUTPATIENT SERVICES | Facility: HOSPITAL | Age: 53
LOS: 1 days | Discharge: HOME | End: 2018-08-28

## 2018-08-28 VITALS — WEIGHT: 273 LBS | HEIGHT: 64 IN | BODY MASS INDEX: 46.61 KG/M2

## 2018-08-28 DIAGNOSIS — E05.90 THYROTOXICOSIS, UNSPECIFIED W/OUT THYROTOXIC CRISIS OR STORM: ICD-10-CM

## 2018-08-28 DIAGNOSIS — E66.01 MORBID (SEVERE) OBESITY DUE TO EXCESS CALORIES: ICD-10-CM

## 2018-08-28 DIAGNOSIS — F41.9 ANXIETY DISORDER, UNSPECIFIED: ICD-10-CM

## 2018-08-28 DIAGNOSIS — E55.9 VITAMIN D DEFICIENCY, UNSPECIFIED: ICD-10-CM

## 2018-08-28 DIAGNOSIS — E78.00 PURE HYPERCHOLESTEROLEMIA, UNSPECIFIED: ICD-10-CM

## 2018-08-28 DIAGNOSIS — M19.90 UNSPECIFIED OSTEOARTHRITIS, UNSPECIFIED SITE: ICD-10-CM

## 2018-08-28 DIAGNOSIS — F32.9 ANXIETY DISORDER, UNSPECIFIED: ICD-10-CM

## 2018-08-28 DIAGNOSIS — Z90.49 ACQUIRED ABSENCE OF OTHER SPECIFIED PARTS OF DIGESTIVE TRACT: Chronic | ICD-10-CM

## 2018-08-28 PROCEDURE — 99212 OFFICE O/P EST SF 10 MIN: CPT

## 2018-08-29 LAB — 25(OH)D3 SERPL-MCNC: 28 NG/ML

## 2018-09-14 ENCOUNTER — OUTPATIENT (OUTPATIENT)
Dept: OUTPATIENT SERVICES | Facility: HOSPITAL | Age: 53
LOS: 1 days | Discharge: HOME | End: 2018-09-14

## 2018-09-14 VITALS
HEART RATE: 72 BPM | HEIGHT: 65 IN | SYSTOLIC BLOOD PRESSURE: 130 MMHG | RESPIRATION RATE: 16 BRPM | TEMPERATURE: 98 F | DIASTOLIC BLOOD PRESSURE: 82 MMHG | WEIGHT: 271.17 LBS | OXYGEN SATURATION: 98 %

## 2018-09-14 DIAGNOSIS — Z01.818 ENCOUNTER FOR OTHER PREPROCEDURAL EXAMINATION: ICD-10-CM

## 2018-09-14 DIAGNOSIS — Z90.49 ACQUIRED ABSENCE OF OTHER SPECIFIED PARTS OF DIGESTIVE TRACT: Chronic | ICD-10-CM

## 2018-09-14 LAB
ALBUMIN SERPL ELPH-MCNC: 4.2 G/DL — SIGNIFICANT CHANGE UP (ref 3.5–5.2)
ALP SERPL-CCNC: 87 U/L — SIGNIFICANT CHANGE UP (ref 30–115)
ALT FLD-CCNC: 16 U/L — SIGNIFICANT CHANGE UP (ref 0–41)
ANION GAP SERPL CALC-SCNC: 14 MMOL/L — SIGNIFICANT CHANGE UP (ref 7–14)
APPEARANCE UR: ABNORMAL
APTT BLD: 34 SEC — SIGNIFICANT CHANGE UP (ref 27–39.2)
AST SERPL-CCNC: 14 U/L — SIGNIFICANT CHANGE UP (ref 0–41)
BACTERIA # UR AUTO: ABNORMAL /HPF
BASOPHILS # BLD AUTO: 0.06 K/UL — SIGNIFICANT CHANGE UP (ref 0–0.2)
BASOPHILS NFR BLD AUTO: 1.1 % — HIGH (ref 0–1)
BILIRUB SERPL-MCNC: 0.3 MG/DL — SIGNIFICANT CHANGE UP (ref 0.2–1.2)
BILIRUB UR-MCNC: NEGATIVE — SIGNIFICANT CHANGE UP
BUN SERPL-MCNC: 13 MG/DL — SIGNIFICANT CHANGE UP (ref 10–20)
CALCIUM SERPL-MCNC: 9.3 MG/DL — SIGNIFICANT CHANGE UP (ref 8.5–10.1)
CHLORIDE SERPL-SCNC: 98 MMOL/L — SIGNIFICANT CHANGE UP (ref 98–110)
CO2 SERPL-SCNC: 28 MMOL/L — SIGNIFICANT CHANGE UP (ref 17–32)
COLOR SPEC: SIGNIFICANT CHANGE UP
CREAT SERPL-MCNC: 1 MG/DL — SIGNIFICANT CHANGE UP (ref 0.7–1.5)
DIFF PNL FLD: NEGATIVE — SIGNIFICANT CHANGE UP
EOSINOPHIL # BLD AUTO: 0.13 K/UL — SIGNIFICANT CHANGE UP (ref 0–0.7)
EOSINOPHIL NFR BLD AUTO: 2.4 % — SIGNIFICANT CHANGE UP (ref 0–8)
EPI CELLS # UR: ABNORMAL /HPF
GLUCOSE SERPL-MCNC: 109 MG/DL — HIGH (ref 70–99)
GLUCOSE UR QL: NEGATIVE — SIGNIFICANT CHANGE UP
HCT VFR BLD CALC: 40.4 % — SIGNIFICANT CHANGE UP (ref 37–47)
HGB BLD-MCNC: 12.8 G/DL — SIGNIFICANT CHANGE UP (ref 12–16)
IMM GRANULOCYTES NFR BLD AUTO: 0 % — LOW (ref 0.1–0.3)
INR BLD: 1.03 RATIO — SIGNIFICANT CHANGE UP (ref 0.65–1.3)
KETONES UR-MCNC: NEGATIVE — SIGNIFICANT CHANGE UP
LEUKOCYTE ESTERASE UR-ACNC: ABNORMAL
LYMPHOCYTES # BLD AUTO: 2.63 K/UL — SIGNIFICANT CHANGE UP (ref 1.2–3.4)
LYMPHOCYTES # BLD AUTO: 48.9 % — SIGNIFICANT CHANGE UP (ref 20.5–51.1)
MCHC RBC-ENTMCNC: 25.8 PG — LOW (ref 27–31)
MCHC RBC-ENTMCNC: 31.7 G/DL — LOW (ref 32–37)
MCV RBC AUTO: 81.3 FL — SIGNIFICANT CHANGE UP (ref 81–99)
MONOCYTES # BLD AUTO: 0.44 K/UL — SIGNIFICANT CHANGE UP (ref 0.1–0.6)
MONOCYTES NFR BLD AUTO: 8.2 % — SIGNIFICANT CHANGE UP (ref 1.7–9.3)
NEUTROPHILS # BLD AUTO: 2.12 K/UL — SIGNIFICANT CHANGE UP (ref 1.4–6.5)
NEUTROPHILS NFR BLD AUTO: 39.4 % — LOW (ref 42.2–75.2)
NITRITE UR-MCNC: NEGATIVE — SIGNIFICANT CHANGE UP
NRBC # BLD: 0 /100 WBCS — SIGNIFICANT CHANGE UP (ref 0–0)
PH UR: 5.5 — SIGNIFICANT CHANGE UP (ref 5–8)
PLATELET # BLD AUTO: 374 K/UL — SIGNIFICANT CHANGE UP (ref 130–400)
POTASSIUM SERPL-MCNC: 4.8 MMOL/L — SIGNIFICANT CHANGE UP (ref 3.5–5)
POTASSIUM SERPL-SCNC: 4.8 MMOL/L — SIGNIFICANT CHANGE UP (ref 3.5–5)
PROT SERPL-MCNC: 7.7 G/DL — SIGNIFICANT CHANGE UP (ref 6–8)
PROT UR-MCNC: ABNORMAL
PROTHROM AB SERPL-ACNC: 11.1 SEC — SIGNIFICANT CHANGE UP (ref 9.95–12.87)
RBC # BLD: 4.97 M/UL — SIGNIFICANT CHANGE UP (ref 4.2–5.4)
RBC # FLD: 15.7 % — HIGH (ref 11.5–14.5)
SODIUM SERPL-SCNC: 140 MMOL/L — SIGNIFICANT CHANGE UP (ref 135–146)
SP GR SPEC: >=1.03 — SIGNIFICANT CHANGE UP (ref 1.01–1.03)
UROBILINOGEN FLD QL: 0.2 — SIGNIFICANT CHANGE UP (ref 0.2–0.2)
WBC # BLD: 5.38 K/UL — SIGNIFICANT CHANGE UP (ref 4.8–10.8)
WBC # FLD AUTO: 5.38 K/UL — SIGNIFICANT CHANGE UP (ref 4.8–10.8)
WBC UR QL: ABNORMAL /HPF

## 2018-09-14 RX ORDER — ATORVASTATIN CALCIUM 80 MG/1
1 TABLET, FILM COATED ORAL
Qty: 0 | Refills: 0 | COMMUNITY

## 2018-09-14 RX ORDER — OMEPRAZOLE 10 MG/1
1 CAPSULE, DELAYED RELEASE ORAL
Qty: 0 | Refills: 0 | COMMUNITY

## 2018-09-14 RX ORDER — LISINOPRIL 2.5 MG/1
1 TABLET ORAL
Qty: 0 | Refills: 0 | COMMUNITY

## 2018-09-14 RX ORDER — CHOLECALCIFEROL (VITAMIN D3) 125 MCG
1 CAPSULE ORAL
Qty: 0 | Refills: 0 | COMMUNITY

## 2018-09-14 NOTE — H&P PST ADULT - HISTORY OF PRESENT ILLNESS
54 Y/O FEMALE PRESENTS  TO PAST WITH C/O              PT NOW FOR SCHEDULED PROCEDURE. PT DENIES ANY CP SOB PALP COUGH DYSURIA FEVER URI. PT ABLE TO FRANCES 1-2 FOS W/O SOB 52 Y/O FEMALE PRESENTS  TO PAST WITH C/O UTERINE POLYP  PT NOW FOR SCHEDULED PROCEDURE. PT DENIES ANY CP SOB PALP COUGH DYSURIA FEVER URI. PT ABLE TO FRANCES 1 FOS, 1-2 BLOCKS  W/O SOB

## 2018-09-14 NOTE — H&P PST ADULT - VASCULAR
Musculoskeletal: Negative for arthralgias, gait problem, neck pain and neck stiffness. Skin: Negative for color change and rash. Allergic/Immunologic: Negative for environmental allergies, food allergies and immunocompromised state. Neurological: Negative for dizziness, syncope, facial asymmetry, speech difficulty, light-headedness and headaches. Hematological: Negative for adenopathy. Does not bruise/bleed easily. Psychiatric/Behavioral: Negative for confusion and sleep disturbance. The patient is not nervous/anxious. Objective:   /62 (Site: Right Arm, Position: Sitting)   Pulse 78   Temp 97.7 °F (36.5 °C) (Oral)   Resp 16   Wt 172 lb 4.8 oz (78.2 kg)   BMI 24.72 kg/m²     Physical Exam   Left tympanic membrane is blue. PROCEDURE: FIBEROPTIC LARYNGOSCOPY    A fiberoptic laryngoscopy was performed under topical anesthesia, after using Afrin and Lidocaine spray in the nasal fossa. The nasal fossa, nasopharynx, hypopharynx and larynx were carefully examined. Base of tongue was symmetrical. Epiglottis appeared normal and was not retrodisplaced. True vocal cords had normal mobility, but there was erythema of the medial surface of the true vocal cords and also the subglottic area. No mucosal lesions or masses were noted. No pooling in the pyriform sinuses. Data:  All of the past medical history, past surgical history, family history, social history, allergies and current medications were reviewed with the patient. Assessment & Plan   Diagnoses and all orders for this visit:    1. Chronic cough  OK LARYNGOSCOPY FLEXIBLE DIAGNOSTIC   2. Bilateral hearing loss, unspecified hearing loss type     3. Lightheadedness     4. Long term current use of anticoagulant therapy         The findings were explained and his questions were answered. He would be appropriate to observe the hemotympanum helps with spontaneous resolution. It is unlikely to become infected.   It is not the cause of his
Equal and normal pulses (carotid, femoral, dorsalis pedis)

## 2018-09-14 NOTE — H&P PST ADULT - PMH
GERD (gastroesophageal reflux disease)    Helicobacter pylori (H. pylori) infection    High cholesterol    HTN (hypertension)    Prediabetes GERD (gastroesophageal reflux disease)    Helicobacter pylori (H. pylori) infection    High cholesterol    HTN (hypertension)    OA (osteoarthritis)    Prediabetes Class 3 obesity without serious comorbidity with body mass index (BMI) of 45.0 to 49.9 in adult, unspecified obesity type    GERD (gastroesophageal reflux disease)    Helicobacter pylori (H. pylori) infection    High cholesterol    HTN (hypertension)    OA (osteoarthritis)    Prediabetes

## 2018-09-15 LAB
CULTURE RESULTS: SIGNIFICANT CHANGE UP
SPECIMEN SOURCE: SIGNIFICANT CHANGE UP

## 2018-09-17 DIAGNOSIS — E78.00 PURE HYPERCHOLESTEROLEMIA, UNSPECIFIED: ICD-10-CM

## 2018-09-17 DIAGNOSIS — K21.9 GASTRO-ESOPHAGEAL REFLUX DISEASE WITHOUT ESOPHAGITIS: ICD-10-CM

## 2018-09-17 DIAGNOSIS — E66.9 OBESITY, UNSPECIFIED: ICD-10-CM

## 2018-09-17 DIAGNOSIS — A04.8 OTHER SPECIFIED BACTERIAL INTESTINAL INFECTIONS: ICD-10-CM

## 2018-09-17 DIAGNOSIS — R73.03 PREDIABETES: ICD-10-CM

## 2018-09-17 DIAGNOSIS — I10 ESSENTIAL (PRIMARY) HYPERTENSION: ICD-10-CM

## 2018-09-17 DIAGNOSIS — M19.90 UNSPECIFIED OSTEOARTHRITIS, UNSPECIFIED SITE: ICD-10-CM

## 2018-09-25 ENCOUNTER — APPOINTMENT (OUTPATIENT)
Dept: INTERNAL MEDICINE | Facility: CLINIC | Age: 53
End: 2018-09-25

## 2018-09-25 ENCOUNTER — OUTPATIENT (OUTPATIENT)
Dept: OUTPATIENT SERVICES | Facility: HOSPITAL | Age: 53
LOS: 1 days | Discharge: HOME | End: 2018-09-25

## 2018-09-25 VITALS
BODY MASS INDEX: 46.95 KG/M2 | SYSTOLIC BLOOD PRESSURE: 120 MMHG | DIASTOLIC BLOOD PRESSURE: 80 MMHG | HEART RATE: 64 BPM | TEMPERATURE: 97.9 F | HEIGHT: 64 IN | WEIGHT: 275 LBS

## 2018-09-25 DIAGNOSIS — I10 ESSENTIAL (PRIMARY) HYPERTENSION: ICD-10-CM

## 2018-09-25 DIAGNOSIS — Z90.49 ACQUIRED ABSENCE OF OTHER SPECIFIED PARTS OF DIGESTIVE TRACT: Chronic | ICD-10-CM

## 2018-09-25 DIAGNOSIS — Z01.818 ENCOUNTER FOR OTHER PREPROCEDURAL EXAMINATION: ICD-10-CM

## 2018-09-25 DIAGNOSIS — R73.03 PREDIABETES.: ICD-10-CM

## 2018-09-25 DIAGNOSIS — J31.0 CHRONIC RHINITIS: ICD-10-CM

## 2018-09-25 PROBLEM — E66.9 OBESITY, UNSPECIFIED: Chronic | Status: ACTIVE | Noted: 2018-09-14

## 2018-09-25 PROBLEM — M19.90 UNSPECIFIED OSTEOARTHRITIS, UNSPECIFIED SITE: Chronic | Status: ACTIVE | Noted: 2018-09-14

## 2018-09-25 RX ORDER — FLUTICASONE PROPIONATE 50 UG/1
50 SPRAY, METERED NASAL TWICE DAILY
Qty: 1 | Refills: 0 | Status: ACTIVE | COMMUNITY
Start: 2018-09-25 | End: 1900-01-01

## 2018-09-25 RX ORDER — LISINOPRIL 5 MG/1
5 TABLET ORAL DAILY
Qty: 30 | Refills: 2 | Status: ACTIVE | COMMUNITY
Start: 2017-11-16 | End: 1900-01-01

## 2018-09-25 RX ORDER — CETIRIZINE HYDROCHLORIDE 10 MG/1
10 TABLET, COATED ORAL DAILY
Qty: 7 | Refills: 0 | Status: ACTIVE | COMMUNITY
Start: 2018-09-25 | End: 1900-01-01

## 2018-09-25 RX ORDER — ATORVASTATIN CALCIUM 40 MG/1
40 TABLET, FILM COATED ORAL
Qty: 1 | Refills: 3 | Status: ACTIVE | COMMUNITY
Start: 2018-02-20 | End: 1900-01-01

## 2018-09-25 NOTE — HISTORY OF PRESENT ILLNESS
[FreeTextEntry1] : The patient is a 53 year old female presenting for pre-operative medical clearance. [de-identified] : The patient is a 53 year old female presenting for pre-operative medical clearance. She reports nasal congestion for the past 3 days associated with sneezing and mild headache. The patient also reports pain in both knees that has been persistent over the last few months, as well as left shoulder pain increased on shoulder abduction. ROS otherwise negative including CP, SOB, abdominal pain, nausea/vomiting, changes in bowel habits, dysuria, blood in urine/stool, weight loss.

## 2018-09-25 NOTE — PHYSICAL EXAM
[Supple] : supple [Clear to Auscultation] : lungs were clear to auscultation bilaterally [Regular Rhythm] : with a regular rhythm [Normal S1, S2] : normal S1 and S2 [No Murmur] : no murmur heard [Soft] : abdomen soft [Non Tender] : non-tender [Non-distended] : non-distended [No CVA Tenderness] : no CVA  tenderness [de-identified] : swelling of nasal mucosa

## 2018-09-25 NOTE — PLAN
[FreeTextEntry1] : The patient is a 53 year old female presenting for pre-operative medical clearance.\par \par 1. Pre-operative clearance\par Recommending delaying 9/28 operative procedure for 7-10 days, until symptoms of rhinitis will be resolved.\par EKG normal.\par Labs WNL.\par \par 2. Left shoulder pain on abduction\par F/u left shoulder x-ray.\par Physical therapy.\par Tylenol 500 mg TID PRN.\par \par 3. Nasal congestion possibly secondary to viral infection\par Cetirizine 10 mg qHS x 1 week.\par Flonase 1 puff in each nostril BID.\par \par 4. Hypertension\par /80 on 9/25.\par C/w lisinopril 5 mg PO qD.\par \par 5. Dyslipidemia\par C/w atorvastatin 40 mg qHS.

## 2018-09-26 DIAGNOSIS — I10 ESSENTIAL (PRIMARY) HYPERTENSION: ICD-10-CM

## 2018-09-26 DIAGNOSIS — Z01.818 ENCOUNTER FOR OTHER PREPROCEDURAL EXAMINATION: ICD-10-CM

## 2018-09-26 DIAGNOSIS — R73.03 PREDIABETES: ICD-10-CM

## 2018-09-26 DIAGNOSIS — M25.512 PAIN IN LEFT SHOULDER: ICD-10-CM

## 2018-10-09 ENCOUNTER — OUTPATIENT (OUTPATIENT)
Dept: OUTPATIENT SERVICES | Facility: HOSPITAL | Age: 53
LOS: 1 days | Discharge: HOME | End: 2018-10-09

## 2018-10-09 VITALS
HEIGHT: 64 IN | WEIGHT: 276.24 LBS | OXYGEN SATURATION: 99 % | SYSTOLIC BLOOD PRESSURE: 136 MMHG | HEART RATE: 69 BPM | DIASTOLIC BLOOD PRESSURE: 64 MMHG | TEMPERATURE: 99 F | RESPIRATION RATE: 17 BRPM

## 2018-10-09 DIAGNOSIS — Z01.818 ENCOUNTER FOR OTHER PREPROCEDURAL EXAMINATION: ICD-10-CM

## 2018-10-09 DIAGNOSIS — Z90.49 ACQUIRED ABSENCE OF OTHER SPECIFIED PARTS OF DIGESTIVE TRACT: Chronic | ICD-10-CM

## 2018-10-09 LAB
ALBUMIN SERPL ELPH-MCNC: 3.9 G/DL — SIGNIFICANT CHANGE UP (ref 3.5–5.2)
ALP SERPL-CCNC: 95 U/L — SIGNIFICANT CHANGE UP (ref 30–115)
ALT FLD-CCNC: 15 U/L — SIGNIFICANT CHANGE UP (ref 0–41)
ANION GAP SERPL CALC-SCNC: 16 MMOL/L — HIGH (ref 7–14)
APPEARANCE UR: CLEAR — SIGNIFICANT CHANGE UP
APTT BLD: 32.5 SEC — SIGNIFICANT CHANGE UP (ref 27–39.2)
AST SERPL-CCNC: 14 U/L — SIGNIFICANT CHANGE UP (ref 0–41)
BASOPHILS # BLD AUTO: 0.05 K/UL — SIGNIFICANT CHANGE UP (ref 0–0.2)
BASOPHILS NFR BLD AUTO: 0.9 % — SIGNIFICANT CHANGE UP (ref 0–1)
BILIRUB SERPL-MCNC: 0.2 MG/DL — SIGNIFICANT CHANGE UP (ref 0.2–1.2)
BILIRUB UR-MCNC: NEGATIVE — SIGNIFICANT CHANGE UP
BUN SERPL-MCNC: 15 MG/DL — SIGNIFICANT CHANGE UP (ref 10–20)
CALCIUM SERPL-MCNC: 9.3 MG/DL — SIGNIFICANT CHANGE UP (ref 8.5–10.1)
CHLORIDE SERPL-SCNC: 97 MMOL/L — LOW (ref 98–110)
CO2 SERPL-SCNC: 27 MMOL/L — SIGNIFICANT CHANGE UP (ref 17–32)
COLOR SPEC: YELLOW — SIGNIFICANT CHANGE UP
CREAT SERPL-MCNC: 1 MG/DL — SIGNIFICANT CHANGE UP (ref 0.7–1.5)
DIFF PNL FLD: NEGATIVE — SIGNIFICANT CHANGE UP
EOSINOPHIL # BLD AUTO: 0.17 K/UL — SIGNIFICANT CHANGE UP (ref 0–0.7)
EOSINOPHIL NFR BLD AUTO: 3 % — SIGNIFICANT CHANGE UP (ref 0–8)
GLUCOSE SERPL-MCNC: 101 MG/DL — HIGH (ref 70–99)
GLUCOSE UR QL: NEGATIVE — SIGNIFICANT CHANGE UP
HCT VFR BLD CALC: 38.7 % — SIGNIFICANT CHANGE UP (ref 37–47)
HGB BLD-MCNC: 12.3 G/DL — SIGNIFICANT CHANGE UP (ref 12–16)
IMM GRANULOCYTES NFR BLD AUTO: 0.2 % — SIGNIFICANT CHANGE UP (ref 0.1–0.3)
INR BLD: 0.98 RATIO — SIGNIFICANT CHANGE UP (ref 0.65–1.3)
KETONES UR-MCNC: NEGATIVE — SIGNIFICANT CHANGE UP
LEUKOCYTE ESTERASE UR-ACNC: NEGATIVE — SIGNIFICANT CHANGE UP
LYMPHOCYTES # BLD AUTO: 2.66 K/UL — SIGNIFICANT CHANGE UP (ref 1.2–3.4)
LYMPHOCYTES # BLD AUTO: 46.6 % — SIGNIFICANT CHANGE UP (ref 20.5–51.1)
MCHC RBC-ENTMCNC: 26.1 PG — LOW (ref 27–31)
MCHC RBC-ENTMCNC: 31.8 G/DL — LOW (ref 32–37)
MCV RBC AUTO: 82 FL — SIGNIFICANT CHANGE UP (ref 81–99)
MONOCYTES # BLD AUTO: 0.37 K/UL — SIGNIFICANT CHANGE UP (ref 0.1–0.6)
MONOCYTES NFR BLD AUTO: 6.5 % — SIGNIFICANT CHANGE UP (ref 1.7–9.3)
NEUTROPHILS # BLD AUTO: 2.45 K/UL — SIGNIFICANT CHANGE UP (ref 1.4–6.5)
NEUTROPHILS NFR BLD AUTO: 42.8 % — SIGNIFICANT CHANGE UP (ref 42.2–75.2)
NITRITE UR-MCNC: NEGATIVE — SIGNIFICANT CHANGE UP
NRBC # BLD: 0 /100 WBCS — SIGNIFICANT CHANGE UP (ref 0–0)
PH UR: 6 — SIGNIFICANT CHANGE UP (ref 5–8)
PLATELET # BLD AUTO: 432 K/UL — HIGH (ref 130–400)
POTASSIUM SERPL-MCNC: 4.5 MMOL/L — SIGNIFICANT CHANGE UP (ref 3.5–5)
POTASSIUM SERPL-SCNC: 4.5 MMOL/L — SIGNIFICANT CHANGE UP (ref 3.5–5)
PROT SERPL-MCNC: 7.2 G/DL — SIGNIFICANT CHANGE UP (ref 6–8)
PROT UR-MCNC: NEGATIVE — SIGNIFICANT CHANGE UP
PROTHROM AB SERPL-ACNC: 10.6 SEC — SIGNIFICANT CHANGE UP (ref 9.95–12.87)
RBC # BLD: 4.72 M/UL — SIGNIFICANT CHANGE UP (ref 4.2–5.4)
RBC # FLD: 15.6 % — HIGH (ref 11.5–14.5)
SODIUM SERPL-SCNC: 140 MMOL/L — SIGNIFICANT CHANGE UP (ref 135–146)
SP GR SPEC: >=1.03 — SIGNIFICANT CHANGE UP (ref 1.01–1.03)
UROBILINOGEN FLD QL: 0.2 — SIGNIFICANT CHANGE UP (ref 0.2–0.2)
WBC # BLD: 5.71 K/UL — SIGNIFICANT CHANGE UP (ref 4.8–10.8)
WBC # FLD AUTO: 5.71 K/UL — SIGNIFICANT CHANGE UP (ref 4.8–10.8)

## 2018-10-09 NOTE — H&P PST ADULT - RS GEN PE MLT RESP DETAILS PC
breath sounds equal/normal/respirations non-labored/airway patent/clear to auscultation bilaterally/no chest wall tenderness/good air movement/no intercostal retractions

## 2018-10-09 NOTE — H&P PST ADULT - FAMILY HISTORY
Mother  Still living? Unknown  Family history of stroke, Age at diagnosis: Age Unknown  Stroke, Age at diagnosis: Age Unknown  HTN (hypertension), Age at diagnosis: Age Unknown  DM (diabetes mellitus), Age at diagnosis: Age Unknown     Sibling  Still living? Unknown  Family history of stroke, Age at diagnosis: Age Unknown  Stroke, Age at diagnosis: Age Unknown  HTN (hypertension), Age at diagnosis: Age Unknown  DM (diabetes mellitus), Age at diagnosis: Age Unknown

## 2018-10-09 NOTE — H&P PST ADULT - REASON FOR ADMISSION
PT PRESENTS TO PAST WITH NO SOB, CP, PALPITATIONS, DYSURIA, UTI OR URI AT PRESENT.   PT ABLE TO WALK UP 1   FLIGHTS OF STEPS WITH NO SOB.  AS PER THE PT, THIS IS HIS/HER COMPLETE MEDICAL AND SURGICAL HX, INCLUDING MEDICATIONS PRESCRIBED AND OVER THE COUNTER  PT PRESENTS TO PAST WITH H/O--UTERINE POLYPS PT PRESENTS TO PAST WITH NO SOB, CP, PALPITATIONS, DYSURIA, UTI OR URI AT PRESENT.  PT REPORTS APPROXIMATELY 2-3 WEEKS AGO SHE HAD A SINUS INFECTION.   PT WENT TO SEE PMD. PT UNCERTAIN IF SHE WAS GIVEN ANTIBIOTICS.   PT REPORTS  ALL /S HAVE SUBSIDED.   PT INSTRUCTED TO F/U FOR ANY RECURRENCES.      PT ABLE TO WALK UP 1   FLIGHTS OF STEPS WITH NO SOB.  AS PER THE PT, THIS IS HIS/HER COMPLETE MEDICAL AND SURGICAL HX, INCLUDING MEDICATIONS PRESCRIBED AND OVER THE COUNTER  PT PRESENTS TO PAST WITH H/O--UTERINE POLYPS

## 2018-10-09 NOTE — H&P PST ADULT - PMH
Class 3 obesity without serious comorbidity with body mass index (BMI) of 45.0 to 49.9 in adult, unspecified obesity type    GERD (gastroesophageal reflux disease)    Helicobacter pylori (H. pylori) infection    High cholesterol    HTN (hypertension)    OA (osteoarthritis)    Obstructive sleep apnea on CPAP    Prediabetes

## 2018-10-10 LAB
ESTIMATED AVERAGE GLUCOSE: 128 MG/DL — HIGH (ref 68–114)
HBA1C BLD-MCNC: 6.1 % — HIGH (ref 4–5.6)

## 2018-10-11 PROBLEM — G47.33 OBSTRUCTIVE SLEEP APNEA (ADULT) (PEDIATRIC): Chronic | Status: ACTIVE | Noted: 2018-10-09

## 2018-10-17 ENCOUNTER — APPOINTMENT (OUTPATIENT)
Dept: INTERNAL MEDICINE | Facility: CLINIC | Age: 53
End: 2018-10-17

## 2018-10-22 ENCOUNTER — APPOINTMENT (OUTPATIENT)
Dept: INTERNAL MEDICINE | Facility: CLINIC | Age: 53
End: 2018-10-22

## 2018-10-22 NOTE — PROGRESS NOTE ADULT - SUBJECTIVE AND OBJECTIVE BOX
Patient had been seen in PAST pre op GYN Sx  w/ h/o recent sinus infection . She had been advised to see her PMD for evaluation as to full resolution of the infection prior to surgery . Now , 1 day pre op , patient has not done so - patient was a " no show " for appointment . Patient could not be contacted by the PAST FNP to assess her present state of symptomatology . The FNP , and I , personally spoke with  . He wants to keep the patient on tomorrow's OR schedule as she may show up asymptomatic . I explained that , in this case . she will need to be evaluated by the Anesthesiologist and they will need to discuss the case together - there is the possibility , therefore , that this could result un a DOS cancellation  . He said that he understands and that he will also continue attempting to contact the patient today .

## 2018-10-23 ENCOUNTER — OTHER (OUTPATIENT)
Age: 53
End: 2018-10-23

## 2018-10-30 ENCOUNTER — APPOINTMENT (OUTPATIENT)
Dept: SURGERY | Facility: CLINIC | Age: 53
End: 2018-10-30

## 2018-11-16 ENCOUNTER — OTHER (OUTPATIENT)
Age: 53
End: 2018-11-16

## 2019-02-04 ENCOUNTER — OTHER (OUTPATIENT)
Age: 54
End: 2019-02-04

## 2019-02-27 ENCOUNTER — TRANSCRIPTION ENCOUNTER (OUTPATIENT)
Age: 54
End: 2019-02-27

## 2019-02-28 NOTE — H&P PST ADULT - NS PRO LACT YNNA
Okay to take ibuprofen 200 mg - 4 tablets (800 mg) every 8 hours as needed.  Okay to take tylenol 500 mg - 2 tablets (1000 mg) every 6-8 hours as needed, do not exceed 3000 mg in 24 hours.  Warm compress to area    We will notify you if the throat culture is positive for strep.    Follow up with dental clinic for teeth evaluation.      Patient Education     Salivary Gland Swelling, Uncertain Cause  Salivary glands make saliva in response to food in your mouth. Saliva is mostly water. It also has minerals and proteins that help break down food and keep the mouth and teeth healthy. There are three pairs of salivary glands:    Parotid glands (in front of the ear)    Submandibular glands (below the jaw)    Sublingual glands (below the tongue)  Each gland has a duct (channel) that carries saliva from the gland into the mouth.   Swelling of the salivary glands can sometimes occur. Causes can include:    Viral infection (such as childhood mumps)    Bacterial infections    Sjögren's syndrome    Diabetes    Malnutrition    Sarcoidosis    Blockage of the salivary duct (from stones or tumors)  Certain medicines can affect salivary flow. This can lead to swelling of the gland. Be sure to tell your healthcare provider about all of the medicines you take.  Tests are being done to determine the cause of the swelling. These may include blood tests, X-ray, ultrasound, CT scan, or injection of dye into the duct to look for blockage. Treatment depends on the exact cause of the swelling.  Home care    If the area is painful, you can take over-the-counter medicines, such as acetaminophen or ibuprofen, unless you were prescribed another medicine. Wetting a cloth with warm water and putting it over the affected gland for 10-15 minutes at a time can also help ease pain.    To help prevent blockages and infections:  ? Drink 6-8 glasses of fluid per day (such as water, tea, and clear soup) to keep well hydrated.  ? If you smoke, ask your  healthcare provider for help to quit. Smoking makes salivary gland stones more likely.  ? Maintain good dental hygiene. Brush and floss your teeth daily. See your dentist for regular cleanings.  Follow-up care  Follow up with your healthcare provider or as advised. See your healthcare provider for further exams and testing. If you have been referred to a specialist, make an appointment promptly.  When to seek medical advice  Call your healthcare provider if any of the following occur:    Increasing pain or swelling in the gland    Inability to open mouth or pain when opening mouth    Fever of 100.4 F (38 C) or higher, or as directed by your healthcare provider    Redness over the gland    Pus draining into the mouth    Trouble breathing or swallowing    Any new symptoms  Prevention  Here are steps you can take to help prevent an infection:    Keep good hand washing habits.    Don t have close contact with people who have sore throats, colds, or other upper respiratory infections.    Don t smoke, and stay away from secondhand smoke.    Stay up to date with of your vaccines.  Date Last Reviewed: 11/1/2017 2000-2018 [employer]. [address] [place] [address]. All rights reserved. This information is not intended as a substitute for professional medical care. Always follow your healthcare professional's instructions.           Patient Education     Mononucleosis (Mono)     The best treatment for mono is plenty of rest.     Mononucleosis, also known as mono, is caused by the Patricia-Barr virus. Mono is best known for causing swollen glands and tiredness, but it can cause other symptoms as well. Most children with mono recover without any problems. But the illness can take a long time to go away. In some cases, mono can cause problems with the liver, spleen, or heart. So it is important to diagnose mono and to watch your child carefully.  How mono is spread  Mono can be easily transmitted from  an infected person's saliva by:    Drinking and eating after them    Sharing a straw, cup, toothbrushes, and eating utensils    Kissing and close contact    Handling toys with children drool  Symptoms of mono  In children, common symptoms include:    Tiredness, weakness    Fever    Sore throat    Tender or swollen lymph nodes in the neck or armpits    Swollen tonsils    Rash    Sore muscles or stiffness    Headache    Loss of appetite, nausea    Dull pain in the stomach area    Enlarged liver and spleen    Headaches    Puffy eyes    Sensitivity to light  Treating mono  Because it is a viral infection, antibiotics won t cure mono. Your child's healthcare provider may prescribe medicines to help ease your child's pain or discomfort. The best treatment for mono is rest. A child with mono should also drink lots of fluids. To help your child feel better and recover sooner:    Make sure your child gets enough rest.    Provide plenty of fluids, such as water or apple juice.    The spleen may become enlarged with mono. Your child may need to avoid contact sports, and heavy lifting for a while in order to prevent injury to the spleen. Discuss this with your child's healthcare provider.    Treat fever, sore throat, headache, or aching muscles with acetaminophen or ibuprofen. Never give your child aspirin. Your child's healthcare provider or nurse can help you with the correct dose.  Symptoms usually last for a few weeks, but can sometimes last for 1 to 2 months or longer. Even after symptoms go away, your child may be tired or weak for some time.  Preventing the spread of mono  While you re caring for a child with mono:    Wash your hands with warm water and soap often, especially before and after tending to your sick child.    Monitor your own health and that of other family members.    Limit a sick child s contact with other children.    Clean dishes and eating utensils used by a sick child separately in very hot, soapy  water. Or run them through the .  When to seek medical care  Call your child s healthcare provider right away if your otherwise healthy child:    Is younger than 3 months and has a fever of 100.4 F (38 C) or higher    Is younger than 2 years old and has a fever that lasts more than 24 hours    Is 2 years old or older and the fever continues for 3 days    Has repeated fevers above 104 F (40 C) at any age    Has had a seizure caused by the fever    Experiences difficult or rapid breathing    Can t be soothed or shows signs of irritability or restlessness    Seems unusually drowsy, listless, or unresponsive    Has trouble eating, drinking, or swallowing    Stops breathing, even for an instant    Shows signs of severe chest, neck, or belly pain  Date Last Reviewed: 12/1/2016 2000-2018 [employer]. [address] [place] [address], [place] [address]. All rights reserved. This information is not intended as a substitute for professional medical care. Always follow your healthcare professional's instructions.            no

## 2019-03-01 NOTE — H&P PST ADULT - TEMPERATURE IN FAHRENHEIT (DEGREES F)
[FreeTextEntry1] : I reviewed her ultrasound and the more superficial lesion in segment 5/6 was not sonographically identified.  We will proceed with biopsy of the caudate lobe lesion.  Our booking office will be in touch with the patient to make arrangements.  97.8

## 2019-04-22 ENCOUNTER — APPOINTMENT (OUTPATIENT)
Dept: GENERAL RADIOLOGY | Age: 54
End: 2019-04-22
Payer: MEDICARE

## 2019-04-22 ENCOUNTER — HOSPITAL ENCOUNTER (EMERGENCY)
Age: 54
Discharge: HOME | End: 2019-04-22
Payer: MEDICARE

## 2019-04-22 ENCOUNTER — HOSPITAL ENCOUNTER (OUTPATIENT)
Dept: GENERAL RADIOLOGY | Age: 54
Discharge: HOME | End: 2019-04-24
Payer: MEDICARE

## 2019-04-22 ENCOUNTER — HOSPITAL ENCOUNTER (OUTPATIENT)
Age: 54
Discharge: HOME | End: 2019-04-24
Payer: MEDICARE

## 2019-04-22 VITALS
DIASTOLIC BLOOD PRESSURE: 90 MMHG | RESPIRATION RATE: 18 BRPM | OXYGEN SATURATION: 96 % | WEIGHT: 270 LBS | BODY MASS INDEX: 44.98 KG/M2 | TEMPERATURE: 97.3 F | HEIGHT: 65 IN | SYSTOLIC BLOOD PRESSURE: 166 MMHG | HEART RATE: 88 BPM

## 2019-04-22 DIAGNOSIS — M25.512 ACUTE PAIN OF LEFT SHOULDER: ICD-10-CM

## 2019-04-22 DIAGNOSIS — S93.402A SPRAIN OF LEFT ANKLE, UNSPECIFIED LIGAMENT, INITIAL ENCOUNTER: Primary | ICD-10-CM

## 2019-04-22 PROCEDURE — 73630 X-RAY EXAM OF FOOT: CPT

## 2019-04-22 PROCEDURE — 6370000000 HC RX 637 (ALT 250 FOR IP): Performed by: NURSE PRACTITIONER

## 2019-04-22 PROCEDURE — 73030 X-RAY EXAM OF SHOULDER: CPT

## 2019-04-22 PROCEDURE — 99283 EMERGENCY DEPT VISIT LOW MDM: CPT

## 2019-04-22 PROCEDURE — 73610 X-RAY EXAM OF ANKLE: CPT

## 2019-04-22 RX ORDER — IBUPROFEN 800 MG/1
800 TABLET ORAL ONCE
Status: COMPLETED | OUTPATIENT
Start: 2019-04-22 | End: 2019-04-22

## 2019-04-22 RX ORDER — IBUPROFEN 800 MG/1
800 TABLET ORAL EVERY 8 HOURS PRN
Qty: 30 TABLET | Refills: 0 | Status: SHIPPED | OUTPATIENT
Start: 2019-04-22 | End: 2019-07-23 | Stop reason: SDUPTHER

## 2019-04-22 RX ADMIN — IBUPROFEN 800 MG: 800 TABLET, FILM COATED ORAL at 13:59

## 2019-04-22 ASSESSMENT — PAIN SCALES - GENERAL: PAINLEVEL_OUTOF10: 8

## 2019-04-22 ASSESSMENT — PAIN DESCRIPTION - ORIENTATION: ORIENTATION: LEFT

## 2019-04-22 ASSESSMENT — PAIN DESCRIPTION - LOCATION: LOCATION: ANKLE

## 2019-04-22 ASSESSMENT — PAIN DESCRIPTION - DESCRIPTORS: DESCRIPTORS: ACHING

## 2019-04-22 NOTE — ED PROVIDER NOTES
jessica.     Lower extremity exam: There is no obvious compartment syndrome. The knee evaluation shows that both knees have no deformity, no swelling, and no proximal fibular head tenderness. There is full painless range of motion of both hips. The ankle evaluation shows normal tendon function, capillary refill less than 2 seconds, distal motor function which is intact, distal sensory function which is intact. The achilies tendon is intact. There is localized swelling and tenderness noted of the ankle along the medial aspect. The foot evaluation shows no tenderness at the base of the fifth metatarsal. There are no lacerations or evidence of open fracture.      ------------------------- NURSING NOTES AND VITALS REVIEWED ---------------------------   The nursing notes within the ED encounter and vital signs as below have been reviewed by myself. BP (!) 166/90   Pulse 88   Temp 97.3 °F (36.3 °C) (Oral)   Resp 18   Ht 5' 5\" (1.651 m)   Wt 270 lb (122.5 kg)   SpO2 96%   BMI 44.93 kg/m²   Oxygen Saturation Interpretation: Normal    The patients available past medical records and past encounters were reviewed. -------------------------------------------------- RESULTS -------------------------------------------------  I have personally reviewed all laboratory and imaging results for this patient. Results are listed below. LABS:  No results found for this visit on 04/22/19. RADIOLOGY:  Interpreted by Radiologist.  XR FOOT LEFT (MIN 3 VIEWS)   Final Result   No radiographic evidence of acute osseous abnormality or   fracture. . If there is persistent clinical pain or symptomatology, a   return to medical attention within 2-7 days and further imaging is   recommended. XR ANKLE LEFT (MIN 3 VIEWS)   Final Result   ALERT:  THIS IS AN ABNORMAL REPORT    1.  Possible intra-articular loose body between the medial malleolus   and the talus measured at approximately 0.3 cm.   2. Left ankle joint effusion.                 ------------------------------ ED COURSE/MEDICAL DECISION MAKING----------------------  Medications   ibuprofen (ADVIL;MOTRIN) tablet 800 mg (800 mg Oral Given 4/22/19 7000)         ED COURSE:       Medical decision making: Left ankle injury with concerns for an ankle fracture based on physical exam. Films are obtained and is questionable for intra-articular avulsion fracture between the medial malleolus and talus, orthopedics was consulted, evaluated in ED and they do not feel that there is a fracture.  Plan is subsequently for symptom control limited weight-bearing as tolerated        Impression:   1) Ankle Sprain    Disposition:  Discharge    Condition:  Stable    Noted patient left ED without ace wrap or D/C instructions.        Candance Sidle, APRN - [facility]  04/22/19 3635

## 2019-04-22 NOTE — CONSULTS
Department of Orthopedic Surgery  Resident Consult Note          Reason for Consult:  L ankle pain    HISTORY OF PRESENT ILLNESS:       Patient is a 46 y.o. female who presents with L ankle pain for 1 week. Pt states she 'leaned' into her L foot 1 week ago with too much weight and began to feel pain to the inside of her heel/ankle. Denies fall or trauma. Since, pt has been able to ambulate with moderate pain to the bottom and medial aspect of foot and ankle. Denies prior pain. States she only wears flip flops, as she is wearing today. Denies numbness/tingling/paresthesias. Denies any other orthopedic complaints at this time. Past Medical History:    History reviewed. No pertinent past medical history. Past Surgical History:        Procedure Laterality Date    APPENDECTOMY       Current Medications:   No current facility-administered medications for this encounter. Allergies:  Patient has no known allergies. Social History:   TOBACCO:   reports that she has never smoked. She has never used smokeless tobacco.  ETOH:   reports that she does not drink alcohol. DRUGS:   reports that she does not use drugs. ACTIVITIES OF DAILY LIVING:    OCCUPATION:    Family History:   History reviewed. No pertinent family history.     REVIEW OF SYSTEMS:  CONSTITUTIONAL:  negative for  fevers, chills  EYES:  negative for blurred vision, visual disturbance  HEENT:  negative for  hearing loss, voice change  RESPIRATORY:  negative for  dyspnea, wheezing  CARDIOVASCULAR:  negative for  chest pain, palpitations  GASTROINTESTINAL:  negative for nausea, vomiting  GENITOURINARY:  negative for frequency, urinary incontinence  HEMATOLOGIC/LYMPHATIC:  negative for bleeding and petechiae  MUSCULOSKELETAL:  positive for  Pain L ankle  NEUROLOGICAL:  negative for headaches, dizziness  BEHAVIOR/PSYCH:  negative for increased agitation and anxiety    PHYSICAL EXAM:    VITALS:  BP (!) 166/90   Pulse 88   Temp 97.3 °F (36.3 °C) (Oral) Resp 18   Ht 5' 5\" (1.651 m)   Wt 270 lb (122.5 kg)   SpO2 96%   BMI 44.93 kg/m²   CONSTITUTIONAL:  awake, alert, cooperative, no apparent distress, and appears stated age  MUSCULOSKELETAL:  Left lower Extremity:  Skin is intact circumferentially. No swelling, ecchymosis, warmth to foot/ankle  +TTP posteromedial aspect of hell/medial malleolus. Compartments soft and compressible  Palpable dorsalis pedis and posterior tibialis pulse, brisk cap refill to toes, foot warm and perfused  Sensation intact to light touch in sural/deep peroneal/superficial peroneal/saphenous/posterior tibial nerve distributions to foot/ankle  Demonstrates active ankle plantar/dorsiflexion/great toe extension  · -anterior drawer.      DATA:    CBC:   Lab Results   Component Value Date    WBC 6.3 06/29/2015    RBC 4.28 06/29/2015    HGB 11.8 06/29/2015    HCT 37.1 06/29/2015    MCV 86.6 06/29/2015    MCH 27.4 06/29/2015    MCHC 31.7 06/29/2015    RDW 15.0 06/29/2015     06/29/2015    MPV 8.7 06/29/2015     PT/INR:  No results found for: PROTIME, INR    Radiology Review:  XR L ankle: No fractures or dislocations about the foot and ankle    IMPRESSION:  · L ankle pain    PLAN:  PWB - LLE  Educated pt about use of footwear with better support  Pain Control per ed  Continue ice and elevation to decrease swelling  Have patient follow up with PCP  Discussed with Dr. Jarocho Bermudez  ·

## 2019-06-17 ENCOUNTER — OFFICE VISIT (OUTPATIENT)
Dept: FAMILY MEDICINE CLINIC | Age: 54
End: 2019-06-17
Payer: MEDICARE

## 2019-06-17 ENCOUNTER — HOSPITAL ENCOUNTER (OUTPATIENT)
Age: 54
Discharge: HOME | End: 2019-06-19
Payer: MEDICARE

## 2019-06-17 VITALS
BODY MASS INDEX: 47.63 KG/M2 | HEART RATE: 87 BPM | HEIGHT: 64 IN | OXYGEN SATURATION: 99 % | DIASTOLIC BLOOD PRESSURE: 80 MMHG | TEMPERATURE: 98 F | WEIGHT: 279 LBS | SYSTOLIC BLOOD PRESSURE: 138 MMHG

## 2019-06-17 DIAGNOSIS — R73.03 PREDIABETES: ICD-10-CM

## 2019-06-17 DIAGNOSIS — M25.512 CHRONIC LEFT SHOULDER PAIN: ICD-10-CM

## 2019-06-17 DIAGNOSIS — J30.2 SEASONAL ALLERGIC RHINITIS, UNSPECIFIED TRIGGER: ICD-10-CM

## 2019-06-17 DIAGNOSIS — S93.402D SPRAIN OF LEFT ANKLE, UNSPECIFIED LIGAMENT, SUBSEQUENT ENCOUNTER: ICD-10-CM

## 2019-06-17 DIAGNOSIS — E78.2 MIXED HYPERLIPIDEMIA: ICD-10-CM

## 2019-06-17 DIAGNOSIS — I10 ESSENTIAL HYPERTENSION: ICD-10-CM

## 2019-06-17 DIAGNOSIS — I10 ESSENTIAL HYPERTENSION: Primary | ICD-10-CM

## 2019-06-17 DIAGNOSIS — G89.29 CHRONIC LEFT SHOULDER PAIN: ICD-10-CM

## 2019-06-17 LAB
ALBUMIN SERPL-MCNC: 4.3 G/DL (ref 3.5–5.2)
ALP BLD-CCNC: 82 U/L (ref 35–104)
ALT SERPL-CCNC: 17 U/L (ref 0–32)
ANION GAP SERPL CALCULATED.3IONS-SCNC: 16 MMOL/L (ref 7–16)
AST SERPL-CCNC: 18 U/L (ref 0–31)
BASOPHILS ABSOLUTE: 0.06 E9/L (ref 0–0.2)
BASOPHILS RELATIVE PERCENT: 1.2 % (ref 0–2)
BILIRUB SERPL-MCNC: 0.4 MG/DL (ref 0–1.2)
BUN BLDV-MCNC: 12 MG/DL (ref 6–20)
CALCIUM SERPL-MCNC: 9.7 MG/DL (ref 8.6–10.2)
CHLORIDE BLD-SCNC: 102 MMOL/L (ref 98–107)
CHOLESTEROL, TOTAL: 264 MG/DL (ref 0–199)
CO2: 23 MMOL/L (ref 22–29)
CREAT SERPL-MCNC: 1 MG/DL (ref 0.5–1)
EOSINOPHILS ABSOLUTE: 0.12 E9/L (ref 0.05–0.5)
EOSINOPHILS RELATIVE PERCENT: 2.4 % (ref 0–6)
GFR AFRICAN AMERICAN: >60
GFR NON-AFRICAN AMERICAN: 58 ML/MIN/1.73
GLUCOSE BLD-MCNC: 103 MG/DL (ref 74–99)
HBA1C MFR BLD: 6.1 % (ref 4–5.6)
HCT VFR BLD CALC: 46.7 % (ref 34–48)
HDLC SERPL-MCNC: 50 MG/DL
HEMOGLOBIN: 14.8 G/DL (ref 11.5–15.5)
IMMATURE GRANULOCYTES #: 0.01 E9/L
IMMATURE GRANULOCYTES %: 0.2 % (ref 0–5)
LDL CHOLESTEROL CALCULATED: 177 MG/DL (ref 0–99)
LYMPHOCYTES ABSOLUTE: 2.21 E9/L (ref 1.5–4)
LYMPHOCYTES RELATIVE PERCENT: 44 % (ref 20–42)
MCH RBC QN AUTO: 28 PG (ref 26–35)
MCHC RBC AUTO-ENTMCNC: 31.7 % (ref 32–34.5)
MCV RBC AUTO: 88.4 FL (ref 80–99.9)
MONOCYTES ABSOLUTE: 0.4 E9/L (ref 0.1–0.95)
MONOCYTES RELATIVE PERCENT: 8 % (ref 2–12)
NEUTROPHILS ABSOLUTE: 2.22 E9/L (ref 1.8–7.3)
NEUTROPHILS RELATIVE PERCENT: 44.2 % (ref 43–80)
PDW BLD-RTO: 16.8 FL (ref 11.5–15)
PLATELET # BLD: 397 E9/L (ref 130–450)
PMV BLD AUTO: 11 FL (ref 7–12)
POTASSIUM SERPL-SCNC: 4.7 MMOL/L (ref 3.5–5)
RBC # BLD: 5.28 E12/L (ref 3.5–5.5)
SODIUM BLD-SCNC: 141 MMOL/L (ref 132–146)
T4 FREE: 1.35 NG/DL (ref 0.93–1.7)
TOTAL PROTEIN: 8.3 G/DL (ref 6.4–8.3)
TRIGL SERPL-MCNC: 187 MG/DL (ref 0–149)
TSH SERPL DL<=0.05 MIU/L-ACNC: 1.45 UIU/ML (ref 0.27–4.2)
VLDLC SERPL CALC-MCNC: 37 MG/DL
WBC # BLD: 5 E9/L (ref 4.5–11.5)

## 2019-06-17 PROCEDURE — 1036F TOBACCO NON-USER: CPT | Performed by: FAMILY MEDICINE

## 2019-06-17 PROCEDURE — 3017F COLORECTAL CA SCREEN DOC REV: CPT | Performed by: FAMILY MEDICINE

## 2019-06-17 PROCEDURE — 83036 HEMOGLOBIN GLYCOSYLATED A1C: CPT

## 2019-06-17 PROCEDURE — 99204 OFFICE O/P NEW MOD 45 MIN: CPT | Performed by: FAMILY MEDICINE

## 2019-06-17 PROCEDURE — 84443 ASSAY THYROID STIM HORMONE: CPT

## 2019-06-17 PROCEDURE — 80061 LIPID PANEL: CPT

## 2019-06-17 PROCEDURE — G8417 CALC BMI ABV UP PARAM F/U: HCPCS | Performed by: FAMILY MEDICINE

## 2019-06-17 PROCEDURE — 85025 COMPLETE CBC W/AUTO DIFF WBC: CPT

## 2019-06-17 PROCEDURE — 80053 COMPREHEN METABOLIC PANEL: CPT

## 2019-06-17 PROCEDURE — G8427 DOCREV CUR MEDS BY ELIG CLIN: HCPCS | Performed by: FAMILY MEDICINE

## 2019-06-17 PROCEDURE — 84439 ASSAY OF FREE THYROXINE: CPT

## 2019-06-17 RX ORDER — LISINOPRIL 5 MG/1
5 TABLET ORAL DAILY
Qty: 30 TABLET | Refills: 5 | Status: SHIPPED | OUTPATIENT
Start: 2019-06-17

## 2019-06-17 RX ORDER — LORATADINE 10 MG/1
10 TABLET ORAL DAILY
Qty: 30 TABLET | Refills: 2 | Status: SHIPPED | OUTPATIENT
Start: 2019-06-17

## 2019-06-17 RX ORDER — LISINOPRIL 5 MG/1
5 TABLET ORAL DAILY
COMMUNITY
End: 2019-06-17 | Stop reason: SDUPTHER

## 2019-06-17 RX ORDER — OMEPRAZOLE 20 MG/1
40 CAPSULE, DELAYED RELEASE ORAL DAILY
COMMUNITY

## 2019-06-17 RX ORDER — ATORVASTATIN CALCIUM 40 MG/1
40 TABLET, FILM COATED ORAL DAILY
COMMUNITY
End: 2019-09-09 | Stop reason: SDUPTHER

## 2019-06-17 RX ORDER — CHOLECALCIFEROL (VITAMIN D3) 1250 MCG
CAPSULE ORAL WEEKLY
COMMUNITY

## 2019-06-17 ASSESSMENT — PATIENT HEALTH QUESTIONNAIRE - PHQ9
SUM OF ALL RESPONSES TO PHQ QUESTIONS 1-9: 2
1. LITTLE INTEREST OR PLEASURE IN DOING THINGS: 1
SUM OF ALL RESPONSES TO PHQ QUESTIONS 1-9: 2
2. FEELING DOWN, DEPRESSED OR HOPELESS: 1
SUM OF ALL RESPONSES TO PHQ9 QUESTIONS 1 & 2: 2

## 2019-06-17 NOTE — PROGRESS NOTES
HPI:  Patient is a 53y.o. year old female presenting today as a new patient to establish care. Previous  PCP was Dr. Mark Grewal. Last seen a few weeks. Patient is complaining of issues with her foot. She states that she was referred to orthopedic surgeon. She states that she has an appt with an orthopedic surgeon on 6/28/19. She has an appt with Dr. Quincy Rojas. She was referred to physical therapy at [facility] but has not gone. She is requesting a referral to physical therapy closer to home. Patient states that she is having problems with her allergies. She has had nasal congestion, runny nose, itchy watery eyes. She has not been taking anything for allergies. She has been on medication in the past but is unsure what she took. Prediabetes: patient is supposed to be on metformin but she has not taken it. Last labs were done about 2 months ago. Hypertension: blood pressure is well controlled. Patient is taking lisinopril 5 mg daily    Hyperlipidemia: well controlled per patient. On atorvastatin as prescribed. Patient states that she has arthritis in her left arm, both knees and neck. She is interested in doing physical therapy locally. Past Medical History:   Diagnosis Date    Hyperlipidemia     Hypertension     Prediabetes         Past Surgical History:   Procedure Laterality Date    APPENDECTOMY         Current Outpatient Medications   Medication Sig Dispense Refill    atorvastatin (LIPITOR) 40 MG tablet Take 40 mg by mouth daily      Cholecalciferol (VITAMIN D3) 70707 units CAPS Take by mouth once a week      omeprazole (PRILOSEC) 20 MG delayed release capsule Take 40 mg by mouth daily      lisinopril (PRINIVIL;ZESTRIL) 5 MG tablet Take 1 tablet by mouth daily 30 tablet 5    loratadine (CLARITIN) 10 MG tablet Take 1 tablet by mouth daily 30 tablet 2    ibuprofen (IBU) 800 MG tablet Take 1 tablet by mouth every 8 hours as needed for Pain Take with food.  30 tablet 0    Compression Stockings MISC by Does not apply route 1 each 0     No current facility-administered medications for this visit.         No Known Allergies    Family History   Problem Relation Age of Onset    Diabetes Mother     Stroke Mother     No Known Problems Father     Diabetes Sister     Stroke Sister     Diabetes Brother     Stroke Brother     No Known Problems Daughter     No Known Problems Son     No Known Problems Son     No Known Problems Son     No Known Problems Son     No Known Problems Son     No Known Problems Son        Social History     Socioeconomic History    Marital status: Single     Spouse name: Not on file    Number of children: 9    Years of education: Not on file    Highest education level: Not on file   Occupational History    Not on file   Social Needs    Financial resource strain: Not on file    Food insecurity:     Worry: Not on file     Inability: Not on file    Transportation needs:     Medical: Not on file     Non-medical: Not on file   Tobacco Use    Smoking status: Never Smoker    Smokeless tobacco: Never Used   Substance and Sexual Activity    Alcohol use: No    Drug use: No    Sexual activity: Not Currently   Lifestyle    Physical activity:     Days per week: Not on file     Minutes per session: Not on file    Stress: Not on file   Relationships    Social connections:     Talks on phone: Not on file     Gets together: Not on file     Attends Orthodoxy service: Not on file     Active member of club or organization: Not on file     Attends meetings of clubs or organizations: Not on file     Relationship status: Not on file    Intimate partner violence:     Fear of current or ex partner: Not on file     Emotionally abused: Not on file     Physically abused: Not on file     Forced sexual activity: Not on file   Other Topics Concern    Not on file   Social History Narrative    Not on file       Review of Systems :  General ROS: negative for - chills, fatigue or fever  Psychological ROS: negative for - anxiety, depression or suicidal ideation  Ophthalmic ROS: negative for - blurry vision or double vision  ENT ROS: negative for - nasal congestion, nasal discharge, sinus pain or sore throat  Allergy and Immunology ROS: negative for - itchy/watery eyes, nasal congestion, postnasal drip or seasonal allergies  Hematological and Lymphatic ROS: negative for - bleeding problems, bruising, fatigue or swollen lymph nodes  Endocrine ROS: negative for - palpitations, polydipsia/polyuria or unexpected weight changes  Respiratory ROS: negative for - cough, shortness of breath, sputum changes, tachypnea or wheezing  Cardiovascular ROS: negative for - chest pain, dyspnea on exertion, edema, murmur, palpitations, rapid heart rate or shortness of breath  Gastrointestinal ROS: negative for - blood in stools, constipation, diarrhea, heartburn or nausea/vomiting  Genito-Urinary ROS: negative for - dysuria, hematuria, incontinence or nocturia  Musculoskeletal ROS: positive for joint pain.  negative for - gait disturbance, joint stiffness, joint swelling, muscle pain or muscular weakness  Neurological ROS: negative for - bowel and bladder control changes, dizziness, headaches, visual changes or weakness  Dermatological ROS: negative for - dry skin, eczema, mole changes or rash    Vitals:    06/17/19 1351   BP: 138/80   Site: Right Upper Arm   Position: Sitting   Cuff Size: Large Adult   Pulse: 87   Temp: 98 °F (36.7 °C)   TempSrc: Oral   SpO2: 99%   Weight: 279 lb (126.6 kg)   Height: 5' 4\" (1.626 m)       General Appearance:  Alert, cooperative, no distress, appears stated age    Head:  Normocephalic, without obvious abnormality, atraumatic    Eyes:  PERRL, conjunctiva/corneas clear, EOM's intact, both eyes    Ears:  Normal TM's and external ear canals, both ears    Nose:  Nares normal, septum midline,mucosa normal, no drainage or sinus tenderness    Throat:  Lips, mucosa, and tongue normal; teeth and gums normal    Neck:  Supple, symmetrical, trachea midline, no adenopathy; thyroid: not enlarged, symmetric, no tenderness/mass/nodules; no carotid bruit or JVD    Back:  Symmetric, no curvature, ROM normal, no CVA tenderness    Lungs:  Clear to auscultation bilaterally, respirations unlabored    Heart:  Regular rate and rhythm, S1 and S2 normal, no murmur, rub, or gallop    Abdomen:  Soft, non-tender, bowel sounds active all four quadrants, no masses, no organomegaly    Extremities:  Extremities normal, atraumatic, no cyanosis or edema    Pulses:  2+ and symmetric    Skin:  Skin color, texture, no rashes or lesions    Lymph nodes:  No cervical adenopathy    Neurologic:  Normal      1. Essential hypertension  Blood pressure well controlled  Continue current medications  Labs ordered  Diet and exercise were discussed and recommended to the patient. - Comprehensive Metabolic Panel; Future  - CBC Auto Differential; Future  - T4, Free; Future  - TSH without Reflex; Future  - lisinopril (PRINIVIL;ZESTRIL) 5 MG tablet; Take 1 tablet by mouth daily  Dispense: 30 tablet; Refill: 5    2. Mixed hyperlipidemia  Continue atorvastatin  Labs ordered  Diet and exercise were discussed and recommended to the patient. - Comprehensive Metabolic Panel; Future  - CBC Auto Differential; Future  - Lipid Panel; Future    3. Prediabetes  Labs ordered  Patient is supposed to be on metformin but has been noncompliant.   - Comprehensive Metabolic Panel; Future  - CBC Auto Differential; Future  - Lipid Panel; Future  - T4, Free; Future  - TSH without Reflex; Future  - Hemoglobin A1C; Future    4. Sprain of left ankle, unspecified ligament, subsequent encounter  [facility]  Await recommendations from Dr. Oswaldo Wright  Referral to PT>  - Amb External Referral To Physical Therapy    5. Chronic left shoulder pain  [facility] therapy  Referral to physical therapy  - Amb External Referral To Physical Therapy    6.  Seasonal allergic

## 2019-06-17 NOTE — PATIENT INSTRUCTIONS
help.  · Take pain medicines exactly as directed. ? If the doctor gave you a prescription medicine for pain, take it as prescribed. ? If you are not taking a prescription pain medicine, ask your doctor if you can take an over-the-counter medicine. When should you call for help? Call your doctor now or seek immediate medical care if:    · The pain is so bad that you cannot use the joint.     · You have sudden back pain with weakness in your legs or loss of bowel or bladder control.     · Your stools are black and tarlike or have streaks of blood.     · You have severe pain and swelling in more than one joint.    Watch closely for changes in your health, and be sure to contact your doctor if:    · You have side effects from the medicines, like belly pain, ongoing heartburn, or nausea.     · Joint pain continues for more than 6 weeks, and home treatment is not helping. Where can you learn more? Go to https://Billtrust.nubelo. org and sign in to your [employer] account. Enter F609 in the [employer] box to learn more about \"Osteoarthritis: Care Instructions. \"     If you do not have an account, please click on the \"Sign Up Now\" link. Current as of: Yary 10, 2018  Content Version: 12.0  © 0235-9768 [employer]. Care instructions adapted under license by [facility] ([facility]). If you have questions about a medical condition or this instruction, always ask your healthcare professional. [place] [address] any warranty or liability for your use of this information.

## 2019-06-18 ENCOUNTER — TELEPHONE (OUTPATIENT)
Dept: FAMILY MEDICINE CLINIC | Age: 54
End: 2019-06-18

## 2019-06-18 DIAGNOSIS — R73.03 PREDIABETES: Primary | ICD-10-CM

## 2019-06-18 NOTE — TELEPHONE ENCOUNTER
Patient called the office stating the Metformin she has is  and is requesting a new prescription.  It is written for 500mg taken once daily with meals

## 2019-06-27 DIAGNOSIS — M25.572 ACUTE LEFT ANKLE PAIN: Primary | ICD-10-CM

## 2019-07-12 ENCOUNTER — TELEPHONE (OUTPATIENT)
Dept: FAMILY MEDICINE CLINIC | Age: 54
End: 2019-07-12

## 2019-07-23 ENCOUNTER — OFFICE VISIT (OUTPATIENT)
Dept: FAMILY MEDICINE CLINIC | Age: 54
End: 2019-07-23
Payer: MEDICARE

## 2019-07-23 VITALS
WEIGHT: 278 LBS | BODY MASS INDEX: 46.32 KG/M2 | DIASTOLIC BLOOD PRESSURE: 89 MMHG | HEART RATE: 103 BPM | TEMPERATURE: 97.6 F | RESPIRATION RATE: 18 BRPM | SYSTOLIC BLOOD PRESSURE: 138 MMHG | HEIGHT: 65 IN | OXYGEN SATURATION: 99 %

## 2019-07-23 DIAGNOSIS — J30.2 SEASONAL ALLERGIC RHINITIS, UNSPECIFIED TRIGGER: ICD-10-CM

## 2019-07-23 DIAGNOSIS — G56.02 CARPAL TUNNEL SYNDROME ON LEFT: Primary | ICD-10-CM

## 2019-07-23 PROCEDURE — G8417 CALC BMI ABV UP PARAM F/U: HCPCS | Performed by: FAMILY MEDICINE

## 2019-07-23 PROCEDURE — 99214 OFFICE O/P EST MOD 30 MIN: CPT | Performed by: FAMILY MEDICINE

## 2019-07-23 PROCEDURE — G8427 DOCREV CUR MEDS BY ELIG CLIN: HCPCS | Performed by: FAMILY MEDICINE

## 2019-07-23 PROCEDURE — 1036F TOBACCO NON-USER: CPT | Performed by: FAMILY MEDICINE

## 2019-07-23 PROCEDURE — 3017F COLORECTAL CA SCREEN DOC REV: CPT | Performed by: FAMILY MEDICINE

## 2019-07-23 RX ORDER — IBUPROFEN 600 MG/1
TABLET ORAL
COMMUNITY
Start: 2019-06-17 | End: 2019-07-23 | Stop reason: SDUPTHER

## 2019-07-23 RX ORDER — CETIRIZINE HYDROCHLORIDE 10 MG/1
10 TABLET ORAL DAILY
Qty: 30 TABLET | Refills: 5 | Status: SHIPPED | OUTPATIENT
Start: 2019-07-23

## 2019-07-23 RX ORDER — IBUPROFEN 800 MG/1
800 TABLET ORAL EVERY 8 HOURS PRN
Qty: 90 TABLET | Refills: 2 | Status: SHIPPED | OUTPATIENT
Start: 2019-07-23

## 2019-07-23 ASSESSMENT — ENCOUNTER SYMPTOMS
SINUS PRESSURE: 0
SHORTNESS OF BREATH: 0
VOMITING: 0
COUGH: 0
CONSTIPATION: 0
NAUSEA: 0
WHEEZING: 0
ABDOMINAL PAIN: 0
RHINORRHEA: 0
DIARRHEA: 0
BACK PAIN: 0
SORE THROAT: 1

## 2019-07-23 NOTE — PROGRESS NOTES
Pain:  Patient is here today with complaints of left hand pain. This is a/an acute problem. This has been going on for 2 month(s). Exacerbating factors include sleeping. Alleviating factors include putting her arm out to her side. Pain is stabbing in nature. 7/10. Pain is  Radiating into her elbow. She states that she gets a numbness when she is sleeping especially. This has not happen to patient before. Imaging to date includes no imaging. Therapy to date includes no therapy. Patient states about 3 days ago, she started with sneezing and sore throat. She states that today symptoms are a little bit better. Her throat is still hurting a little bit. She has not taken anything for her symptoms. She denies any fevers. Patient's past medical, surgical, social and/or family history reviewed, updated in chart, and are non-contributory (unless otherwise stated). Medications and allergies also reviewed andupdated in chart. /89 (Site: Left Upper Arm, Position: Sitting, Cuff Size: Large Adult)   Pulse 103   Temp 97.6 °F (36.4 °C) (Oral)   Resp 18   Ht 5' 5\" (1.651 m)   Wt 278 lb (126.1 kg)   LMP 04/15/2018   SpO2 99%   BMI 46.26 kg/m²     Review of Systems   Constitutional: Negative for chills, fatigue and fever. HENT: Positive for congestion, sneezing and sore throat. Negative for ear discharge, ear pain, postnasal drip, rhinorrhea and sinus pressure. Respiratory: Negative for cough, shortness of breath and wheezing. Cardiovascular: Negative for chest pain, palpitations and leg swelling. Gastrointestinal: Negative for abdominal pain, constipation, diarrhea, nausea and vomiting. Genitourinary: Negative for dysuria, frequency and hematuria. Musculoskeletal: Positive for arthralgias. Negative for back pain and myalgias. Skin: Negative for rash. Neurological: Positive for numbness. Negative for dizziness, weakness, light-headedness and headaches.        Physical Exam

## 2019-09-09 ENCOUNTER — OFFICE VISIT (OUTPATIENT)
Dept: FAMILY MEDICINE CLINIC | Age: 54
End: 2019-09-09
Payer: MEDICARE

## 2019-09-09 VITALS
DIASTOLIC BLOOD PRESSURE: 76 MMHG | BODY MASS INDEX: 47.8 KG/M2 | OXYGEN SATURATION: 99 % | RESPIRATION RATE: 20 BRPM | HEART RATE: 74 BPM | HEIGHT: 64 IN | TEMPERATURE: 98.2 F | WEIGHT: 280 LBS | SYSTOLIC BLOOD PRESSURE: 134 MMHG

## 2019-09-09 DIAGNOSIS — H01.022 SQUAMOUS BLEPHARITIS OF LOWER EYELIDS OF BOTH EYES: ICD-10-CM

## 2019-09-09 DIAGNOSIS — R35.89 POLYURIA: Primary | ICD-10-CM

## 2019-09-09 DIAGNOSIS — N39.0 URINARY TRACT INFECTION WITHOUT HEMATURIA, SITE UNSPECIFIED: ICD-10-CM

## 2019-09-09 DIAGNOSIS — R73.03 PREDIABETES: ICD-10-CM

## 2019-09-09 DIAGNOSIS — H01.025 SQUAMOUS BLEPHARITIS OF LOWER EYELIDS OF BOTH EYES: ICD-10-CM

## 2019-09-09 DIAGNOSIS — H10.13 ALLERGIC CONJUNCTIVITIS OF BOTH EYES: ICD-10-CM

## 2019-09-09 DIAGNOSIS — E78.2 MIXED HYPERLIPIDEMIA: ICD-10-CM

## 2019-09-09 LAB
BILIRUBIN, POC: NORMAL
BLOOD URINE, POC: NORMAL
CLARITY, POC: CLEAR
COLOR, POC: NORMAL
GLUCOSE URINE, POC: NORMAL
KETONES, POC: NORMAL
LEUKOCYTE EST, POC: NORMAL
NITRITE, POC: NORMAL
PH, POC: 6.5
PROTEIN, POC: NORMAL
SPECIFIC GRAVITY, POC: 1.02
UROBILINOGEN, POC: 0.2

## 2019-09-09 PROCEDURE — 3017F COLORECTAL CA SCREEN DOC REV: CPT | Performed by: FAMILY MEDICINE

## 2019-09-09 PROCEDURE — G8417 CALC BMI ABV UP PARAM F/U: HCPCS | Performed by: FAMILY MEDICINE

## 2019-09-09 PROCEDURE — 99214 OFFICE O/P EST MOD 30 MIN: CPT | Performed by: FAMILY MEDICINE

## 2019-09-09 PROCEDURE — 1036F TOBACCO NON-USER: CPT | Performed by: FAMILY MEDICINE

## 2019-09-09 PROCEDURE — G8427 DOCREV CUR MEDS BY ELIG CLIN: HCPCS | Performed by: FAMILY MEDICINE

## 2019-09-09 RX ORDER — ATORVASTATIN CALCIUM 40 MG/1
40 TABLET, FILM COATED ORAL DAILY
Qty: 30 TABLET | Refills: 3 | Status: SHIPPED | OUTPATIENT
Start: 2019-09-09

## 2019-09-09 RX ORDER — NITROFURANTOIN 25; 75 MG/1; MG/1
100 CAPSULE ORAL 2 TIMES DAILY
Qty: 14 CAPSULE | Refills: 0 | Status: SHIPPED | OUTPATIENT
Start: 2019-09-09 | End: 2019-09-16

## 2019-09-09 RX ORDER — KETOTIFEN FUMARATE 0.35 MG/ML
1 SOLUTION/ DROPS OPHTHALMIC 2 TIMES DAILY
Qty: 1 ML | Refills: 0 | Status: SHIPPED | OUTPATIENT
Start: 2019-09-09 | End: 2019-09-19

## 2019-09-09 ASSESSMENT — ENCOUNTER SYMPTOMS
ABDOMINAL PAIN: 0
RHINORRHEA: 0
SINUS PRESSURE: 0
VOMITING: 0
DIARRHEA: 0
COUGH: 0
CONSTIPATION: 0
WHEEZING: 0
EYE ITCHING: 1
SORE THROAT: 0
BACK PAIN: 0
SHORTNESS OF BREATH: 0
NAUSEA: 0

## 2019-10-29 NOTE — SWALLOW BEDSIDE ASSESSMENT ADULT - CONSISTENCIES ADMINISTERED
Detail Level: Zone Hide Additional Notes?: No Additional Notes (Optional): Patient can get OTC Vitamin K cream to help 3 oz/thin liquid puree/3 oz mech soft/3 oz

## 2021-05-02 NOTE — H&P PST ADULT - NS PRO FEM  PAP SMEARS 3YRS
yes Zofran 4 mg every 6 hrs as needed for nausea  Meclizine 25 mg every 8 hrs as needed for dizziness  Follow up with your doctor later today  Follow up with Neurology-call to schedule appointment  Return sooner for any problems        Dizziness    Dizziness can manifest as a feeling of unsteadiness or light-headedness. You may feel like you are about to faint. This condition can be caused by a number of things, including medicines, dehydration, or illness. Drink enough fluid to keep your urine clear or pale yellow. Do not drink alcohol and limit your caffeine intake. Avoid quick or sudden movements.  Rise slowly from chairs and steady yourself until you feel okay. In the morning, first sit up on the side of the bed.    SEEK IMMEDIATE MEDICAL CARE IF YOU HAVE ANY OF THE FOLLOWING SYMPTOMS: vomiting, changes in your vision or speech, weakness in your arms or legs, trouble speaking or swallowing, chest pain, abdominal pain, shortness of breath, sweating, bleeding, headache, neck pain, or fever.      Nausea / Vomiting    Nausea is the feeling that you have to vomit. As nausea gets worse, it can lead to vomiting. Vomiting puts you at an increased risk for dehydration. Older adults and people with other diseases or a weak immune system are at higher risk for dehydration. Drink clear fluids in small but frequent amounts as tolerated. Eat bland, easy-to-digest foods in small amounts as tolerated.    SEEK IMMEDIATE MEDICAL CARE IF YOU HAVE ANY OF THE FOLLOWING SYMPTOMS: fever, inability to keep sufficient fluids down, black or bloody vomitus, black or bloody stools, lightheadedness/dizziness, chest pain, severe headache, rash, shortness of breath, cold or clammy skin, confusion, pain with urination, or any signs of dehydration.

## 2022-07-31 NOTE — PHYSICAL THERAPY INITIAL EVALUATION ADULT - PRECAUTIONS/LIMITATIONS, REHAB EVAL
Pt to tolerate diet consistency w/ >50-75% of most meals/supplements; deter wt loss.  cardiac precautions

## 2023-12-28 NOTE — PHYSICAL THERAPY INITIAL EVALUATION ADULT - TRANSFER SKILLS, REHAB EVAL
independent
Cantharidin Counseling: Calcipotriene Counseling:  I discussed with the patient the risks of calcipotriene including but not limited to erythema, scaling, itching, and irritation.
upper

## 2024-07-14 NOTE — H&P PST ADULT - PULMONARY EMBOLUS
Pt ambulated into the ED with c/o chest pain. Pt describes the pain at tightness in his esophagus. Pt also endorses right arm tightness x 1 month.    no